# Patient Record
Sex: FEMALE | Race: WHITE | NOT HISPANIC OR LATINO | Employment: FULL TIME | URBAN - METROPOLITAN AREA
[De-identification: names, ages, dates, MRNs, and addresses within clinical notes are randomized per-mention and may not be internally consistent; named-entity substitution may affect disease eponyms.]

---

## 2017-05-12 ENCOUNTER — OFFICE VISIT (OUTPATIENT)
Dept: NEUROSURGERY | Facility: CLINIC | Age: 37
End: 2017-05-12

## 2017-05-12 VITALS
DIASTOLIC BLOOD PRESSURE: 75 MMHG | SYSTOLIC BLOOD PRESSURE: 131 MMHG | BODY MASS INDEX: 21.72 KG/M2 | HEART RATE: 47 BPM | WEIGHT: 122.6 LBS | HEIGHT: 63 IN

## 2017-05-12 DIAGNOSIS — M54.12 CERVICAL RADICULOPATHY: Primary | ICD-10-CM

## 2017-05-12 PROCEDURE — 99202 OFFICE O/P NEW SF 15 MIN: CPT | Performed by: PHYSICIAN ASSISTANT

## 2017-05-12 RX ORDER — IBUPROFEN 200 MG
200 TABLET ORAL EVERY 6 HOURS PRN
COMMUNITY
End: 2020-02-14 | Stop reason: HOSPADM

## 2017-06-01 ENCOUNTER — APPOINTMENT (OUTPATIENT)
Dept: PHYSICAL THERAPY | Facility: HOSPITAL | Age: 37
End: 2017-06-01

## 2017-06-12 ENCOUNTER — HOSPITAL ENCOUNTER (OUTPATIENT)
Dept: PHYSICAL THERAPY | Facility: HOSPITAL | Age: 37
Setting detail: THERAPIES SERIES
Discharge: HOME | End: 2017-06-12

## 2017-06-12 DIAGNOSIS — G89.29 CHRONIC LEFT SHOULDER PAIN: Primary | ICD-10-CM

## 2017-06-12 DIAGNOSIS — M25.512 CHRONIC LEFT SHOULDER PAIN: Primary | ICD-10-CM

## 2017-06-12 PROCEDURE — 97161 PT EVAL LOW COMPLEX 20 MIN: CPT

## 2017-06-12 PROCEDURE — 97110 THERAPEUTIC EXERCISES: CPT

## 2017-06-12 NOTE — THERAPY EVALUATION
Outpatient Physical Therapy Ortho Initial Evaluation  [facility]     Patient Name: Guerita Perla  : 1980  MRN: [id number]  Today's Date: 2017      Visit Date: 2017    Patient Active Problem List   Diagnosis   • Cervical radiculopathy        History reviewed. No pertinent past medical history.     Past Surgical History:   Procedure Laterality Date   • EXPLORATORY LAPAROTOMY         Visit Dx:     ICD-10-CM ICD-9-CM   1. Chronic left shoulder pain M25.512 719.41    G89.29 338.29             Patient History       17 1600          History    Chief Complaint Pain  -LS      Type of Pain Shoulder pain  -LS      Date Current Problem(s) Began 12  -LS      Brief Description of Current Complaint Pt reports intermittent L shoulder pain that comes and goes with activity and tingling into L hand that has happened for several years as well as burning and pain behind L shoulder surrounding shoulder blade. Heating pad reduces pain as well as laying down. Driving and sitting at her desk inc the pain.   -LS      Onset Date- PT 17  -LS      Patient/Caregiver Goals Relieve pain;Return to prior level of function;Improve mobility;Improve strength  -LS      Hand Dominance right-handed  -LS      Occupation/sports/leisure activities Pt is a  at [employer], [place], operated by [employer]. Plans to get into coding.  -LS      Patient seeing anyone else for problem(s)? Yes  -LS      What clinical tests have you had for this problem? --   none  -LS      Pain     Pain Location Shoulder  -LS      Pain at Present 3  -LS      Pain at Best 3  -LS      Pain at Worst 6  -LS      Pain Description Burning;Tingling  -LS      What Performance Factors Make the Current Problem(s) WORSE? driving, working   -LS      What Performance Factors Make the Current Problem(s) BETTER? heat, medication  -LS      Tolerance Time- Standing no change  -LS      Tolerance Time- Sitting 2 hours  -LS      Tolerance Time- Walking no change  -LS      Tolerance  Time- Lying no change  -LS      Is your sleep disturbed? No  -LS      What position do you sleep in? Right sidelying  -LS      Difficulties at work? Pt reports inc pain with work at desk.  -LS      Difficulties with ADL's? drying hair is difficult, feels weak   -LS      Difficulties with recreational activities? Pt denies   -LS      Fall Risk Assessment    Any falls in the past year: No  -LS      Services    Prior Rehab/Home Health Experiences No  -LS      Daily Activities    Primary Language English  -LS      Pt Participated in POC and Goals Yes  -LS      Safety    Are you being hurt, hit, or frightened by anyone at home or in your life? No  -LS      Are you being neglected by a caregiver No  -LS        User Key  (r) = Recorded By, (t) = Taken By, (c) = Cosigned By    Initials Name Provider Type    LS Toyin Barakat PT Physical Therapist                PT Ortho       06/12/17 1700    Subjective Comments    Subjective Comments I have a hard time relaxing. I have never been able to do it. I get headaches too from the tension.  -LS    Subjective Pain    Able to rate subjective pain? yes  -LS    Pre-Treatment Pain Level 4  -LS    Posture/Observations    Alignment Options Forward head;Rounded shoulders;Scapular elevation  -LS    Forward Head Severe  -LS    Rounded Shoulders Moderate  -LS    Scapular Elevation Right:;Mild  -LS    Sensation    Sensation WNL? WNL  -LS    Additional Comments pt reports intermittent tingling from L shoulder to L hand; all digits  -LS    DTR- Upper Quarter Clearing    Brachioradialis (C6) Bilateral:;2- Normal response  -LS    Triceps (C7) Bilateral:;2- Normal response  -LS    Myotomal Screen- Upper Quarter Clearing    Shoulder flexion (C5) Left:;4+ (Good +);Right:;5 (Normal)  -LS    Elbow flexion/wrist extension (C6) Bilateral:;4+ (Good +)  -LS    Elbow extension/wrist flexion (C7) Bilateral:;4 (Good)  -LS    Finger flexion/ (C8) WNL  -LS    Cervical/Shoulder ROM Screen    Cervical  flexion Normal  -LS    Cervical extension Normal  -LS    Cervical lateral flexion Normal  -LS    Cervical rotation Normal  -LS    Cervical quadrant (Spurling's) Normal  -LS    Shoulder elevation  Normal  -LS    Cervical Palpation    Suboccipital Guarded/taut   minimally  -LS    Spinous Process --   non-tender  -LS    Upper Traps Bilateral:;Guarded/taut   L > R  -LS    Middle Traps Guarded/taut  -LS    Rhomboids Bilateral:;Guarded/taut   L > R  -LS    Cervical Accessory Motions    OA Flexion Hypomobile  -LS    Sideglide- C3 WNL  -LS    Sideglide- C4 WNL  -LS    Sideglide- C5 WNL  -LS    Sideglide- C6 WNL  -LS    Sideglide- C7 WNL  -LS    PA glide- C3 WNL  -LS    PA glide- C4 WNL  -LS    PA glide- C5 WNL  -LS    PA glide- C6 WNL  -LS    PA glide- C7 WNL  -LS    Cervical/Thoracic Special Tests    Spurlings (Foraminal Compression) Negative  -LS    Cervical Compression (Forarminal Compression vs. Facet Pain) Negative  -LS    Cervical Distraction (Foraminal Compression vs. Facet Pain) Negative  -LS    ROM (Range of Motion)    General ROM Detail slight dec in LUE flexion/abduction; WNL   -LS    MMT (Manual Muscle Testing)    General MMT Assessment Detail L ER/IR 4/5; R ER/IR 4+/5  -LS    Upper Extremity Flexibility    Suboccipitals Bilateral:;Mildly limited  -LS    Scalenes Left:;Moderately limited  -LS    Upper Trapezius Bilateral:;Moderately limited  -LS    Pect Minor Bilateral:;Mildly limited  -LS    Pathomechanics    Pathomechanics Comments decreased strength B scapular retractors; difficulty activating.   -LS      User Key  (r) = Recorded By, (t) = Taken By, (c) = Cosigned By    Initials Name Provider Type    ZITA Barakat PT Physical Therapist                            Therapy Education       06/12/17 2129          Therapy Education    Given HEP;Symptoms/condition management;Pain management  -LS      Program New  -LS      How Provided Verbal;Demonstration;Written  -LS      Provided to Patient  -LS      Level of  Understanding Teach back education performed;Verbalized;Demonstrated  -LS        User Key  (r) = Recorded By, (t) = Taken By, (c) = Cosigned By    Initials Name Provider Type    ZITA Barakat PT Physical Therapist                PT OP Goals       06/12/17 1700       PT Short Term Goals    STG Date to Achieve 06/26/17  -LS     STG 1 Pt will demonstrate understanding and compliance with initial HEP  -LS     STG 1 Progress New  -LS     STG 2 Pt will report reduced pain at end of work day from 4/10 to less than or equal to 2/10 with management strategies and flexibility exercises.  -LS     STG 2 Progress New  -LS     STG 3 Pt will report dec incidence of tingling in L hand to less than 2x/week.  -LS     STG 3 Progress New  -LS     Long Term Goals    LTG Date to Achieve 07/12/17  -LS     LTG 1 Pt will report improved tolerance to driving without exacerbation of symptoms.  -LS     LTG 1 Progress New  -LS     LTG 2 Pt will demonstrate reduced disability evidenced by reduced DASH disability score from 15% to less than or equal to 5%.   -LS     LTG 2 Progress New  -LS     LTG 3 Pt will demonstrate improved LUE shoulder flexion/abduction strength to 5/5 and ER/IR strength to 4+/5 or better.  -LS     LTG 3 Progress New  -LS     Time Calculation    PT Goal Re-Cert Due Date 07/12/17  -LS       User Key  (r) = Recorded By, (t) = Taken By, (c) = Cosigned By    Initials Name Provider Type    ZITA Barakat PT Physical Therapist                PT Assessment/Plan       06/12/17 1726       PT Assessment    Functional Limitations Performance in work activities;Limitations in functional capacity and performance;Performance in self-care ADL;Limitation in home management  -LS     Impairments Muscle strength;Range of motion;Pain;Sensation;Impaired flexibility;Poor body mechanics;Posture;Impaired postural alignment  -LS     Assessment Comments Pt is 37 yo R handed female reporting 5 year hx of intermittent L shoulder pain surrounding  L scapula described as burning. She also reports intermittent tingling to L hand. Pt demonstrates normal cervical ROM in all planes without exacerbation of symptoms. Slight deficit in LUE flexion/abduction compared to R but remains functional. Spurling's, compression/distraction, shoulder abduction testing were all negative. RTC and impingement testing were also negative. Pt is slightly TTP in L supraspinatus and rhomboid muscles with moderate to severe tightness in L RTC, periscapular, upper trapezius musculature. She demonstrates normal PA and cervical spine mobility. She presents with poor posture with severely forward head, rounded shoulders, and extended upper cervical spine. 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 Pt demonstrates minimal dec strength L compared to R with greatest deficit in L IR/ER (4/5) compared to R (4+/5). L shoulder flexion/abduction 4+/5. She demonstrates signs and symptoms consistent with dec postural strength and poor body mechanics at work leading to muscular guarding and tightness and dec strength in scapular muscles as well as tightness in anterior muscles due to prolonged posturing. Due to nature of tingling that suggests cervical origin will continue to evaluate as she progresses, however, at today's evaluation, cervical contribution special testing was non-provocative. She will benefit from continued skilled PT services in order to improve postural muscle strength, reduce muscle guarding, and improve pt function.   -LS     Please refer to paper survey for additional self-reported information Yes  -LS     Rehab Potential Good  -LS     Patient/caregiver participated in establishment of treatment plan and goals Yes  -LS     Patient would benefit from skilled therapy intervention Yes  -LS     PT Plan    PT Frequency 2x/week  -LS     Predicted Duration of Therapy Intervention (days/wks) 4 weeks   -LS     Planned CPT's? PT  EVAL LOW COMPLEXITY: 85147;PT MANUAL THERAPY EA 15 MIN: 59438;PT THER PROC EA 15 MIN: 06984;PT THER ACT EA 15 MIN: 86212;PT NEUROMUSC RE-EDUCATION EA 15 MIN: 79881;PT HOT OR COLD PACK TREAT MCARE;PT HOT/COLD PACK WC NONMCARE: 20152;PT TRACTION CERVICAL: 72761  -LS     PT Plan Comments Initiate postural strengthening and flexibility program, consider cervical traction/DDN to reduce muscular guarding as needed.   -LS       User Key  (r) = Recorded By, (t) = Taken By, (c) = Cosigned By    Initials Name Provider Type    ZITA Barakat PT Physical Therapist                  Exercises       06/12/17 1700          Subjective Comments    Subjective Comments I have a hard time relaxing. I have never been able to do it. I get headaches too from the tension.  -LS      Subjective Pain    Able to rate subjective pain? yes  -LS      Pre-Treatment Pain Level 4  -LS      Exercise 1    Exercise Name 1 shoulder rolls  -LS      Exercise 2    Exercise Name 2 scap retraction  -LS      Exercise 3    Exercise Name 3 upper trap stretch  -LS      Exercise 4    Exercise Name 4 middle trap stretch  -LS      Exercise 5    Exercise Name 5 chin tuck supine  -LS        User Key  (r) = Recorded By, (t) = Taken By, (c) = Cosigned By    Initials Name Provider Type    ZITA Barakat PT Physical Therapist                              Outcome Measures       06/12/17 1700          DASH    Open a tight or new jar. 2  -LS      Write 1  -LS      Turn a key 1  -LS      Prepare a meal 1  -LS      Push open a heavy door 1  -LS      Place an object on a shelf above your head 1  -LS      Do heavy household chores (e.g., wash walls, wash floors) 1  -LS      Garden or do yard work 1  -LS      Make a bed 1  -LS      Carry a shopping bag or briefcase 2  -LS      Carry a heavy object (over 10 lbs) 2  -LS      Change a lightbulb overhead 3  -LS      Wash or blow dry your hair 3  -LS      Wash your back 2  -LS      Put on a pullover sweater 2  -LS      Use a  knife to cut food 1  -LS      Recreational activities in which require little effort (e.g., cardplaying, knitting, etc.) 1  -LS      Recreational activities in which you take some force or impact through your arm, should or hand (e.g. golf, hammering, tennis, etc.) 1  -LS      Recreational Activities in which you move your arm freely (e.g., frisbee, badminton, etc.) 2  -LS      Manage transportation needs (getting from one place to another) 1  -LS      Sexual Activities 1  -LS      During the past week, to what extent has your arm, shoulder, or hand problem interfered with your normal social activites with family, friends, neighbors or groups? 1  -LS      During the past week, were you limited in your work or other regular daily activities as a result of your arm, shoulder or hand problem? 1  -LS      Arm, Shoulder, or hand pain 3  -LS      Arm, shoulder or hand pain when you performed any specific activity 3  -LS      Tingling (pins and needles) in your arm, shoulder, or hand 3  -LS      Weakness in your arm, shoulder or hand 2  -LS      Stiffness in your arm, shoulder or hand 1  -LS      During the past week, how much difficulty have you had sleeping because of the pain in your arm, shoulder or hand? 2  -LS      I feel less capable, less confident or less useful because of my arm, shoulder or hand problem 1  -LS      DASH Sum  48  -LS      Number of Questions Answered 30  -LS      DASH Score 15  -LS      DASH COMMENTS Pt is R handed.  -LS      Functional Assessment    Outcome Measure Options Disabilities of the Arm, Shoulder, and Hand (DASH)  -LS        User Key  (r) = Recorded By, (t) = Taken By, (c) = Cosigned By    Initials Name Provider Type    LS Toyin Barakat PT Physical Therapist            Time Calculation:   Start Time: 1615  Stop Time: 1715  Time Calculation (min): 60 min     Therapy Charges for Today     Code Description Service Date Service Provider Modifiers Qty    87421777629  PT EVAL LOW  COMPLEXITY 3 6/12/2017 Toyin Barakat, PT GP 1    [id number] HC PT THER PROC EA 15 MIN 6/12/2017 Toyin Barakat, PT GP 1          PT G-Codes  Outcome Measure Options: Disabilities of the Arm, Shoulder, and Hand (DASH)         Toyin Barakat, PT  6/12/2017

## 2017-06-19 ENCOUNTER — APPOINTMENT (OUTPATIENT)
Dept: PHYSICAL THERAPY | Facility: HOSPITAL | Age: 37
End: 2017-06-19

## 2017-06-21 ENCOUNTER — APPOINTMENT (OUTPATIENT)
Dept: PHYSICAL THERAPY | Facility: HOSPITAL | Age: 37
End: 2017-06-21

## 2017-06-26 ENCOUNTER — APPOINTMENT (OUTPATIENT)
Dept: PHYSICAL THERAPY | Facility: HOSPITAL | Age: 37
End: 2017-06-26

## 2017-06-28 ENCOUNTER — APPOINTMENT (OUTPATIENT)
Dept: PHYSICAL THERAPY | Facility: HOSPITAL | Age: 37
End: 2017-06-28

## 2017-07-05 ENCOUNTER — APPOINTMENT (OUTPATIENT)
Dept: PHYSICAL THERAPY | Facility: HOSPITAL | Age: 37
End: 2017-07-05

## 2017-07-05 ENCOUNTER — DOCUMENTATION (OUTPATIENT)
Dept: PHYSICAL THERAPY | Facility: HOSPITAL | Age: 37
End: 2017-07-05

## 2017-07-05 NOTE — THERAPY DISCHARGE NOTE
Outpatient Physical Therapy Discharge Summary         Patient Name: Guerita Perla  : 1980  MRN: [id number]    Today's Date: 2017    Visit Dx:  No diagnosis found.          PT OP Goals       17 1500       PT Short Term Goals    STG Date to Achieve 17  -LS     STG 1 Pt will demonstrate understanding and compliance with initial HEP  -LS     STG 1 Progress Not Met  -LS     STG 2 Pt will report reduced pain at end of work day from 4/10 to less than or equal to 2/10 with management strategies and flexibility exercises.  -LS     STG 2 Progress Not Met  -LS     STG 3 Pt will report dec incidence of tingling in L hand to less than 2x/week.  -LS     STG 3 Progress Not Met  -LS     Long Term Goals    LTG Date to Achieve 17  -LS     LTG 1 Pt will report improved tolerance to driving without exacerbation of symptoms.  -LS     LTG 1 Progress Not Met  -LS     LTG 2 Pt will demonstrate reduced disability evidenced by reduced DASH disability score from 15% to less than or equal to 5%.   -LS     LTG 2 Progress Not Met  -LS     LTG 3 Pt will demonstrate improved LUE shoulder flexion/abduction strength to 5/5 and ER/IR strength to 4+/5 or better.  -LS     LTG 3 Progress Not Met  -LS       User Key  (r) = Recorded By, (t) = Taken By, (c) = Cosigned By    Initials Name Provider Type    ZITA Barakat, PT Physical Therapist          OP PT Discharge Summary  Date of Discharge: 17  Reason for Discharge: Unable to participate, Non-compliant  Outcomes Achieved: Unable to make functional progress toward goals at this time  Discharge Destination: Home with home program  Discharge Instructions: Pt did not return following first visit due to child sickness. Unable to reassess goals.       Time Calculation:                    Toyin Barakat PT  2017

## 2017-07-10 ENCOUNTER — APPOINTMENT (OUTPATIENT)
Dept: PHYSICAL THERAPY | Facility: HOSPITAL | Age: 37
End: 2017-07-10

## 2017-07-12 ENCOUNTER — APPOINTMENT (OUTPATIENT)
Dept: PHYSICAL THERAPY | Facility: HOSPITAL | Age: 37
End: 2017-07-12

## 2018-01-17 ENCOUNTER — OFFICE VISIT (OUTPATIENT)
Dept: GASTROENTEROLOGY | Facility: CLINIC | Age: 38
End: 2018-01-17

## 2018-01-17 VITALS
WEIGHT: 120.4 LBS | DIASTOLIC BLOOD PRESSURE: 70 MMHG | SYSTOLIC BLOOD PRESSURE: 122 MMHG | BODY MASS INDEX: 21.33 KG/M2 | TEMPERATURE: 98.1 F | HEIGHT: 63 IN

## 2018-01-17 DIAGNOSIS — R13.14 PHARYNGOESOPHAGEAL DYSPHAGIA: Primary | ICD-10-CM

## 2018-01-17 DIAGNOSIS — K21.9 GASTROESOPHAGEAL REFLUX DISEASE, ESOPHAGITIS PRESENCE NOT SPECIFIED: ICD-10-CM

## 2018-01-17 PROCEDURE — 99204 OFFICE O/P NEW MOD 45 MIN: CPT | Performed by: INTERNAL MEDICINE

## 2018-01-17 RX ORDER — UBIDECARENONE 100 MG
100 CAPSULE ORAL DAILY
COMMUNITY
End: 2020-02-14 | Stop reason: HOSPADM

## 2018-01-17 RX ORDER — PANTOPRAZOLE SODIUM 40 MG/1
40 TABLET, DELAYED RELEASE ORAL
Qty: 30 TABLET | Refills: 2 | Status: SHIPPED | OUTPATIENT
Start: 2018-01-17 | End: 2018-05-08 | Stop reason: SDUPTHER

## 2018-01-17 RX ORDER — SODIUM CHLORIDE, SODIUM LACTATE, POTASSIUM CHLORIDE, CALCIUM CHLORIDE 600; 310; 30; 20 MG/100ML; MG/100ML; MG/100ML; MG/100ML
30 INJECTION, SOLUTION INTRAVENOUS CONTINUOUS
Status: CANCELLED | OUTPATIENT
Start: 2018-02-14

## 2018-01-17 NOTE — PROGRESS NOTES
Chief Complaint   Patient presents with   • Difficulty Swallowing   • Heartburn   • esophageal discomfort       Subjective     HPI    Guerita Perla is a 37 y.o. female with a past medical history noted below who presents for evaluation of dysphagia and GERD.  Dysphagia has been an issue for the past 6 months.  No precipitating event.  Finds pills, meat like steak, breads, stick at her suprasternal notch.  She will try to drink lots of liquids to get the foods to pass.  Never had to regurgitate. Longest time a pill stuck it stayed for about 2 hours.  Associated with esophageal discomfort.  She is able to drink water with the food so she is not completely obstructed.  Symptoms are intermittent; only occurs with certain pills and foods.    She has had GERD for at least 10 years.  Se has been on a ppi for this period of time and has never been able to come off of it.  Describe as burning epigastric pain.  Has been on nexium previous, now on omeprazole.    No weight loss with the symptoms.  She does have nausea but not vomiting. No change in bowel habits.    Smokes about 3 cigarrettes daily, working on quitting.  Social ETOH.  No family history of GI malignancies.  She is a stay at home mom.        Past Medical History:   Diagnosis Date   • GERD (gastroesophageal reflux disease)          Current Outpatient Prescriptions:   •  coenzyme Q10 100 MG capsule, Take 100 mg by mouth Daily., Disp: , Rfl:   •  Nutritional Supplements (DHEA PO), Take  by mouth., Disp: , Rfl:   •  omeprazole (priLOSEC) 20 MG capsule, Take 20 mg by mouth Daily., Disp: , Rfl:   •  Prenatal Vit-Fe Fumarate-FA (PRENATAL FA PO), Take  by mouth., Disp: , Rfl:   •  ibuprofen (ADVIL,MOTRIN) 200 MG tablet, Take 200 mg by mouth Every 6 (Six) Hours As Needed for Mild Pain (1-3)., Disp: , Rfl:     No Known Allergies    Social History     Social History   • Marital status:      Spouse name: N/A   • Number of children: 1   • Years of education: College      Occupational History   • [employer]      Social History Main Topics   • Smoking status: Current Every Day Smoker     Packs/day: 0.25     Types: Cigarettes     Start date: 2002   • Smokeless tobacco: Never Used      Comment: 1 cigarette per day   • Alcohol use Yes      Comment: Socially    • Drug use: No   • Sexual activity: Not on file     Other Topics Concern   • Not on file     Social History Narrative       Family History   Problem Relation Age of Onset   • No Known Problems Mother    • No Known Problems Father        Review of Systems   Constitutional: Negative for activity change, appetite change and fatigue.   HENT: Positive for trouble swallowing. Negative for sore throat.    Respiratory: Negative.    Cardiovascular: Negative.    Gastrointestinal: Positive for nausea. Negative for abdominal distention, abdominal pain, blood in stool, constipation, diarrhea and vomiting.   Endocrine: Negative for cold intolerance and heat intolerance.   Genitourinary: Negative for difficulty urinating, dysuria and frequency.   Musculoskeletal: Negative for arthralgias, back pain and myalgias.   Skin: Negative.    Hematological: Negative for adenopathy. Does not bruise/bleed easily.   All other systems reviewed and are negative.      Objective     Vitals:    01/17/18 1256   BP: 122/70   Temp: 98.1 °F (36.7 °C)     Last 2 weights    01/17/18  1256   Weight: 54.6 kg (120 lb 6.4 oz)     Body mass index is 21.33 kg/(m^2).    Physical Exam   Constitutional: She is oriented to person, place, and time. She appears well-developed and well-nourished. No distress.   HENT:   Head: Normocephalic and atraumatic.   Right Ear: External ear normal.   Left Ear: External ear normal.   Nose: Nose normal.   Mouth/Throat: Oropharynx is clear and moist.   Eyes: Conjunctivae and EOM are normal. Right eye exhibits no discharge. Left eye exhibits no discharge. No scleral icterus.   Neck: Normal range of motion. Neck supple. No thyromegaly present.   No  supraclavicular adenopathy   Cardiovascular: Normal rate, regular rhythm, normal heart sounds and intact distal pulses.  Exam reveals no gallop.    No murmur heard.  No lower extremity edema   Pulmonary/Chest: Effort normal and breath sounds normal. No respiratory distress. She has no wheezes.   Abdominal: Soft. Normal appearance and bowel sounds are normal. She exhibits no distension and no mass. There is no hepatosplenomegaly. There is tenderness. There is no rigidity, no rebound and no guarding. No hernia.   Mid abd tenderness   Genitourinary:   Genitourinary Comments: Rectal exam deferred   Musculoskeletal: Normal range of motion. She exhibits no edema or tenderness.   No atrophy of upper or lower extremities.  Normal digits and nails of both hands.   Lymphadenopathy:     She has no cervical adenopathy.   Neurological: She is alert and oriented to person, place, and time. She displays no atrophy. Coordination normal.   Skin: Skin is warm and dry. No rash noted. She is not diaphoretic. No erythema.   Psychiatric: She has a normal mood and affect. Her behavior is normal. Judgment and thought content normal.   Vitals reviewed.      WBC   Date Value Ref Range Status   03/26/2015 9.56 3.50 - 10.80 K/mcL Final     RBC   Date Value Ref Range Status   03/26/2015 3.19 (L) 3.89 - 5.14 M/mcL Final     Hemoglobin   Date Value Ref Range Status   03/26/2015 10.1 (L) 11.5 - 15.5 g/dL Final     Hematocrit   Date Value Ref Range Status   03/26/2015 30.6 (L) 34.5 - 44.0 % Final     MCV   Date Value Ref Range Status   03/26/2015 95.9 80.0 - 99.0 fL Final     MCH   Date Value Ref Range Status   03/26/2015 31.7 (H) 27.0 - 31.0 pg Final     MCHC   Date Value Ref Range Status   03/26/2015 33.0 32.0 - 36.0 g/dL Final     Platelets   Date Value Ref Range Status   03/26/2015 126 (L) 150 - 450 K/mcL Final     Neutrophil Rel %   Date Value Ref Range Status   09/11/2014 64.8 41.0 - 71.0 % Final     Lymphocyte Rel %   Date Value Ref Range  Status   09/11/2014 26.7 24.0 - 44.0 % Final     Monocyte Rel %   Date Value Ref Range Status   09/11/2014 6.8 0.0 - 12.0 % Final     Eosinophil Rel %   Date Value Ref Range Status   09/11/2014 1.3 0.0 - 3.0 % Final     Basophil Rel %   Date Value Ref Range Status   09/11/2014 0.1 0.0 - 1.0 % Final     Neutrophils Absolute   Date Value Ref Range Status   09/11/2014 4.58 1.50 - 8.30 K/mcL Final     Lymphocytes Absolute   Date Value Ref Range Status   09/11/2014 1.89 0.60 - 4.80 K/mcL Final     Monocytes Absolute   Date Value Ref Range Status   09/11/2014 0.48 0.00 - 1.00 K/mcL Final     Eosinophils Absolute   Date Value Ref Range Status   09/11/2014 0.09 (L) 0.10 - 0.30 K/mcL Final     Basophils Absolute   Date Value Ref Range Status   09/11/2014 0.01 0.00 - 0.20 K/mcL Final       Glucose   Date Value Ref Range Status   09/11/2014 84 70 - 100 mg/dL Final     Creatinine   Date Value Ref Range Status   09/11/2014 0.5 (L) 0.6 - 1.3 mg/dL Final         Imaging Results (last 7 days)     ** No results found for the last 168 hours. **            No notes on file    Assessment/Plan    1. Dysphagia: new issue for her for the past 6 months.  ? Stricture, EoE    2. GERD: chronic problem, on omeprazole, has been on this for a long time    Plan  EGD for further eval of symptoms  Stop omeprazole    Start pantoprazole  Further recommendations after EGD  To ER for any food sticking >1hr    Guerita was seen today for difficulty swallowing, heartburn and esophageal discomfort.    Diagnoses and all orders for this visit:    Pharyngoesophageal dysphagia    Gastroesophageal reflux disease, esophagitis presence not specified      I have discussed the above plan with the patient.  They verbalize understanding and are in agreement with the plan.  They have been advised to contact the office for any questions, concerns, or changes related to their health.    Dictated utilizing Dragon dictation

## 2018-01-17 NOTE — PATIENT INSTRUCTIONS
Schedule the EGD    Stop the omeprazole.  Start the pantoprazole instead    To ER for any pills or food sticking for more than an hour    For any additional questions, concerns or changes to your condition after today's office visit please contact the office at [phone].

## 2018-02-14 ENCOUNTER — ANESTHESIA (OUTPATIENT)
Dept: GASTROENTEROLOGY | Facility: HOSPITAL | Age: 38
End: 2018-02-14

## 2018-02-14 ENCOUNTER — ANESTHESIA EVENT (OUTPATIENT)
Dept: GASTROENTEROLOGY | Facility: HOSPITAL | Age: 38
End: 2018-02-14

## 2018-02-14 ENCOUNTER — HOSPITAL ENCOUNTER (OUTPATIENT)
Facility: HOSPITAL | Age: 38
Setting detail: HOSPITAL OUTPATIENT SURGERY
Discharge: HOME | End: 2018-02-14
Attending: INTERNAL MEDICINE | Admitting: INTERNAL MEDICINE

## 2018-02-14 VITALS
DIASTOLIC BLOOD PRESSURE: 60 MMHG | HEIGHT: 63 IN | OXYGEN SATURATION: 97 % | RESPIRATION RATE: 16 BRPM | TEMPERATURE: 97.1 F | WEIGHT: 119.2 LBS | HEART RATE: 54 BPM | BODY MASS INDEX: 21.12 KG/M2 | SYSTOLIC BLOOD PRESSURE: 107 MMHG

## 2018-02-14 DIAGNOSIS — K21.9 GASTROESOPHAGEAL REFLUX DISEASE, ESOPHAGITIS PRESENCE NOT SPECIFIED: ICD-10-CM

## 2018-02-14 DIAGNOSIS — R13.14 PHARYNGOESOPHAGEAL DYSPHAGIA: ICD-10-CM

## 2018-02-14 LAB
B-HCG UR QL: NEGATIVE
INTERNAL NEGATIVE CONTROL: NEGATIVE
INTERNAL POSITIVE CONTROL: POSITIVE
Lab: NORMAL

## 2018-02-14 PROCEDURE — 88312 SPECIAL STAINS GROUP 1: CPT | Performed by: INTERNAL MEDICINE

## 2018-02-14 PROCEDURE — 25010000002 PROPOFOL 10 MG/ML EMULSION: Performed by: NURSE ANESTHETIST, CERTIFIED REGISTERED

## 2018-02-14 PROCEDURE — 43239 EGD BIOPSY SINGLE/MULTIPLE: CPT | Performed by: INTERNAL MEDICINE

## 2018-02-14 PROCEDURE — 88305 TISSUE EXAM BY PATHOLOGIST: CPT | Performed by: INTERNAL MEDICINE

## 2018-02-14 PROCEDURE — 25010000002 FENTANYL CITRATE (PF) 100 MCG/2ML SOLUTION: Performed by: NURSE ANESTHETIST, CERTIFIED REGISTERED

## 2018-02-14 RX ORDER — FENTANYL CITRATE 50 UG/ML
INJECTION, SOLUTION INTRAMUSCULAR; INTRAVENOUS AS NEEDED
Status: DISCONTINUED | OUTPATIENT
Start: 2018-02-14 | End: 2018-02-14 | Stop reason: SURG

## 2018-02-14 RX ORDER — LIDOCAINE HYDROCHLORIDE 20 MG/ML
INJECTION, SOLUTION INFILTRATION; PERINEURAL AS NEEDED
Status: DISCONTINUED | OUTPATIENT
Start: 2018-02-14 | End: 2018-02-14 | Stop reason: SURG

## 2018-02-14 RX ORDER — SODIUM CHLORIDE, SODIUM LACTATE, POTASSIUM CHLORIDE, CALCIUM CHLORIDE 600; 310; 30; 20 MG/100ML; MG/100ML; MG/100ML; MG/100ML
30 INJECTION, SOLUTION INTRAVENOUS CONTINUOUS
Status: DISCONTINUED | OUTPATIENT
Start: 2018-02-14 | End: 2018-02-14 | Stop reason: HOSPADM

## 2018-02-14 RX ORDER — PROPOFOL 10 MG/ML
VIAL (ML) INTRAVENOUS CONTINUOUS PRN
Status: DISCONTINUED | OUTPATIENT
Start: 2018-02-14 | End: 2018-02-14 | Stop reason: SURG

## 2018-02-14 RX ORDER — PROPOFOL 10 MG/ML
VIAL (ML) INTRAVENOUS AS NEEDED
Status: DISCONTINUED | OUTPATIENT
Start: 2018-02-14 | End: 2018-02-14 | Stop reason: SURG

## 2018-02-14 RX ADMIN — PROPOFOL 250 MCG/KG/MIN: 10 INJECTION, EMULSION INTRAVENOUS at 09:23

## 2018-02-14 RX ADMIN — FENTANYL CITRATE 25 MCG: 50 INJECTION INTRAMUSCULAR; INTRAVENOUS at 09:23

## 2018-02-14 RX ADMIN — PROPOFOL 80 MG: 10 INJECTION, EMULSION INTRAVENOUS at 09:23

## 2018-02-14 RX ADMIN — LIDOCAINE HYDROCHLORIDE 100 MG: 20 INJECTION, SOLUTION INFILTRATION; PERINEURAL at 09:23

## 2018-02-14 RX ADMIN — SODIUM CHLORIDE, POTASSIUM CHLORIDE, SODIUM LACTATE AND CALCIUM CHLORIDE 30 ML/HR: 600; 310; 30; 20 INJECTION, SOLUTION INTRAVENOUS at 08:45

## 2018-02-14 NOTE — ANESTHESIA PREPROCEDURE EVALUATION
Anesthesia Evaluation                  Airway   Mallampati: I  TM distance: >3 FB  Neck ROM: full  Dental - normal exam     Pulmonary    (+) a smoker Former,   Cardiovascular     (+) dysrhythmias (palpitations without ass symptoms, reecent echo and stress OK),       Neuro/Psych  GI/Hepatic/Renal/Endo    (+)  GERD,     Musculoskeletal     Abdominal    Substance History      OB/GYN          Other                        Anesthesia Plan    ASA 1     MAC     intravenous induction   Anesthetic plan and risks discussed with patient.

## 2018-02-14 NOTE — ANESTHESIA POSTPROCEDURE EVALUATION
"Patient: Guerita Perla    Procedure Summary     Date Anesthesia Start Anesthesia Stop Room / Location    02/14/18 0921 0943  [facility] /  [facility]       Procedure Diagnosis Surgeon Provider    ESOPHAGOGASTRODUODENOSCOPY with cold bx's (N/A Esophagus) Pharyngoesophageal dysphagia; Gastroesophageal reflux disease, esophagitis presence not specified  (Pharyngoesophageal dysphagia [R13.14]; Gastroesophageal reflux disease, esophagitis presence not specified [K21.9]) MD Karen Hull MD          Anesthesia Type: MAC  Last vitals  BP   112/92 (02/14/18 1020)   Temp   36.2 °C (97.1 °F) (02/14/18 1011)   Pulse   117 (02/14/18 1020)   Resp   18 (02/14/18 1020)     SpO2   (!) 88 % (02/14/18 1020)     Post Anesthesia Care and Evaluation    Patient location during evaluation: PHASE II  Patient participation: complete - patient participated  Level of consciousness: awake and alert  Pain management: adequate  Airway patency: patent  Anesthetic complications: No anesthetic complications    Cardiovascular status: acceptable  Respiratory status: acceptable  Hydration status: acceptable    Comments: /92 (BP Location: Left arm, Patient Position: Lying)  Pulse 117  Temp 36.2 °C (97.1 °F) (Oral)   Resp 18  Ht 160 cm (63\")  Wt 54.1 kg (119 lb 3.2 oz)  LMP 01/16/2018  SpO2 (!) 88%  BMI 21.12 kg/m2      "

## 2018-02-14 NOTE — PLAN OF CARE
Problem: Patient Care Overview (Adult)  Goal: Adult Individualization and Mutuality  Outcome: Ongoing (interventions implemented as appropriate)   02/14/18 0833   Individualization   Patient Specific Interventions denies     Goal: Discharge Needs Assessment  Outcome: Ongoing (interventions implemented as appropriate)   02/14/18 0833   Discharge Needs Assessment   Concerns To Be Addressed no discharge needs identified   Discharge Disposition home or self-care   Self-Care   Equipment Currently Used at Home none       Problem: GI Endoscopy (Adult)  Goal: Signs and Symptoms of Listed Potential Problems Will be Absent or Manageable (GI Endoscopy)  Outcome: Ongoing (interventions implemented as appropriate)   02/14/18 0833   GI Endoscopy   Problems Assessed (GI Endoscopy) pain;bleeding;fluid imbalance;hypoxia/hypoxemia   Problems Present (GI Endoscopy) none

## 2018-02-15 DIAGNOSIS — K63.9 SMALL INTESTINE DISORDER: Primary | ICD-10-CM

## 2018-02-15 LAB
CYTO UR: NORMAL
LAB AP CASE REPORT: NORMAL
Lab: NORMAL
PATH REPORT.FINAL DX SPEC: NORMAL
PATH REPORT.GROSS SPEC: NORMAL

## 2018-02-15 NOTE — PROGRESS NOTES
Her small bowel showed some very minor pathologic changes; this could be seen in celiac disease.  I have ordered celiac serology blood testing for her to come in and get at her convenience.  She does have some mild gastritis of her stomach and some inflammation of her lower esophagus most consistent with reflux.  Recommend continuing the pantoprazole.  Follow-up with me in office in about 3 months.

## 2018-02-16 ENCOUNTER — RESULTS ENCOUNTER (OUTPATIENT)
Dept: GASTROENTEROLOGY | Facility: CLINIC | Age: 38
End: 2018-02-16

## 2018-02-16 DIAGNOSIS — K63.9 SMALL INTESTINE DISORDER: ICD-10-CM

## 2018-02-21 ENCOUNTER — TELEPHONE (OUTPATIENT)
Dept: GASTROENTEROLOGY | Facility: CLINIC | Age: 38
End: 2018-02-21

## 2018-02-21 NOTE — TELEPHONE ENCOUNTER
----- Message from Dyana Garza MD sent at 2/15/2018 12:47 PM EST -----  Her small bowel showed some very minor pathologic changes; this could be seen in celiac disease.  I have ordered celiac serology blood testing for her to come in and get at her convenience.  She does have some mild gastritis of her stomach and some inflammation of her lower esophagus most consistent with reflux.  Recommend continuing the pantoprazole.  Follow-up with me in office in about 3 months.      Called pt and advised of the above and pt verb understanding.  Pt made lab appt for 02/26 at 10am and she made her f/u appt for 06/04 at 930am.

## 2018-02-23 ENCOUNTER — TELEPHONE (OUTPATIENT)
Dept: GASTROENTEROLOGY | Facility: CLINIC | Age: 38
End: 2018-02-23

## 2018-02-27 LAB
IGA SERPL-MCNC: 139 MG/DL (ref 87–352)
TTG IGA SER-ACNC: <2 U/ML (ref 0–3)
TTG IGG SER-ACNC: 2 U/ML (ref 0–5)

## 2018-03-01 ENCOUNTER — TELEPHONE (OUTPATIENT)
Dept: GASTROENTEROLOGY | Facility: CLINIC | Age: 38
End: 2018-03-01

## 2018-03-01 NOTE — TELEPHONE ENCOUNTER
----- Message from Dyana Garza MD sent at 3/1/2018  1:01 PM EST -----  Her blood work is not consistent with celiac disease.    If she is otherwise doing okay will touch base with her at her follow-up appointment in June.

## 2018-03-01 NOTE — TELEPHONE ENCOUNTER
----- Message from Debra Posadas sent at 3/1/2018 10:05 AM EST -----  Regarding: LABS   Contact: [phone]  PT CALLED FOR LABS

## 2018-03-01 NOTE — TELEPHONE ENCOUNTER
Call to pt. Advise per Dr Garza that blood work is not consistent with celiac disease.    If otherwise doing ok, will touch base at f/u appt in June.    Pt verb understanding.  States is doing well, and will see in June.

## 2018-03-09 ENCOUNTER — APPOINTMENT (OUTPATIENT)
Dept: WOMENS IMAGING | Facility: HOSPITAL | Age: 38
End: 2018-03-09

## 2018-03-09 PROCEDURE — 77066 DX MAMMO INCL CAD BI: CPT | Performed by: RADIOLOGY

## 2018-03-09 PROCEDURE — 77062 BREAST TOMOSYNTHESIS BI: CPT | Performed by: RADIOLOGY

## 2018-03-09 PROCEDURE — 76641 ULTRASOUND BREAST COMPLETE: CPT | Performed by: RADIOLOGY

## 2018-03-09 PROCEDURE — G0279 TOMOSYNTHESIS, MAMMO: HCPCS | Performed by: RADIOLOGY

## 2018-03-16 ENCOUNTER — APPOINTMENT (OUTPATIENT)
Dept: WOMENS IMAGING | Facility: HOSPITAL | Age: 38
End: 2018-03-16

## 2018-03-16 PROCEDURE — 19001 PUNCTURE ASPIR CYST BRST EA: CPT | Performed by: RADIOLOGY

## 2018-03-16 PROCEDURE — 19000 PUNCTURE ASPIR CYST BREAST: CPT | Performed by: RADIOLOGY

## 2018-03-16 PROCEDURE — 76942 ECHO GUIDE FOR BIOPSY: CPT | Performed by: RADIOLOGY

## 2018-05-08 ENCOUNTER — TELEPHONE (OUTPATIENT)
Dept: GASTROENTEROLOGY | Facility: CLINIC | Age: 38
End: 2018-05-08

## 2018-05-08 DIAGNOSIS — R13.14 PHARYNGOESOPHAGEAL DYSPHAGIA: ICD-10-CM

## 2018-05-08 DIAGNOSIS — K21.9 GASTROESOPHAGEAL REFLUX DISEASE, ESOPHAGITIS PRESENCE NOT SPECIFIED: ICD-10-CM

## 2018-05-08 RX ORDER — PANTOPRAZOLE SODIUM 40 MG/1
40 TABLET, DELAYED RELEASE ORAL
Qty: 30 TABLET | Refills: 2 | Status: SHIPPED | OUTPATIENT
Start: 2018-05-08 | End: 2018-09-06 | Stop reason: SDUPTHER

## 2018-05-08 NOTE — TELEPHONE ENCOUNTER
Escribe request for Pantoprazole located and filled as requested.    Call to pt to advise of same.  Verb understanding.

## 2018-05-08 NOTE — TELEPHONE ENCOUNTER
----- Message from Essence Montanez sent at 5/8/2018 10:29 AM EDT -----  Regarding: PT CALLED, WAITING ON PENDING MEDS   Contact: [phone]  ..

## 2018-07-19 ENCOUNTER — TRANSCRIBE ORDERS (OUTPATIENT)
Dept: LAB | Facility: HOSPITAL | Age: 38
End: 2018-07-19

## 2018-07-19 ENCOUNTER — LAB (OUTPATIENT)
Dept: LAB | Facility: HOSPITAL | Age: 38
End: 2018-07-19

## 2018-07-19 DIAGNOSIS — IMO0002 FEMALE INFERTILITY OF CERVICAL OR VAGINAL ORIGIN: Primary | ICD-10-CM

## 2018-07-19 DIAGNOSIS — IMO0002 FEMALE INFERTILITY OF CERVICAL OR VAGINAL ORIGIN: ICD-10-CM

## 2018-07-19 LAB — LH SERPL-ACNC: 8.32 MIU/ML

## 2018-07-19 PROCEDURE — 82670 ASSAY OF TOTAL ESTRADIOL: CPT

## 2018-07-19 PROCEDURE — 83002 ASSAY OF GONADOTROPIN (LH): CPT

## 2018-07-19 PROCEDURE — 36415 COLL VENOUS BLD VENIPUNCTURE: CPT

## 2018-07-20 LAB — ESTRADIOL SERPL HS-MCNC: 182.6 PG/ML

## 2018-09-06 DIAGNOSIS — R13.14 PHARYNGOESOPHAGEAL DYSPHAGIA: ICD-10-CM

## 2018-09-06 DIAGNOSIS — K21.9 GASTROESOPHAGEAL REFLUX DISEASE, ESOPHAGITIS PRESENCE NOT SPECIFIED: ICD-10-CM

## 2018-09-07 NOTE — TELEPHONE ENCOUNTER
Dbeby request received for pantoprazole 40 mg 1 tab po qam, #30, R2.  See o/v note of 1/17/18.  Was to return in June - cancelled appt.  Message to Dr Garza.

## 2018-09-10 ENCOUNTER — TELEPHONE (OUTPATIENT)
Dept: GASTROENTEROLOGY | Facility: CLINIC | Age: 38
End: 2018-09-10

## 2018-09-10 RX ORDER — PANTOPRAZOLE SODIUM 40 MG/1
40 TABLET, DELAYED RELEASE ORAL DAILY
Qty: 30 TABLET | Refills: 0 | Status: SHIPPED | OUTPATIENT
Start: 2018-09-10 | End: 2018-10-10 | Stop reason: SDUPTHER

## 2018-09-10 NOTE — TELEPHONE ENCOUNTER
Called pt and advised we can send one 30 day supply Of pantoprazole 40mg  to get her by till her appt with Eva RIOS.  Pt verb understanding and requested script get sent to the [facility]/  Pt verb understanding and script escribed.

## 2018-09-10 NOTE — TELEPHONE ENCOUNTER
----- Message from Merle Nguyen sent at 9/10/2018  9:02 AM EDT -----  Regarding: PT CALLED - SEE PENDING REFILL REQUEST  Contact: [phone]  [facility] O/V IN OCT WITH AYLIN. PT STATED SHE IS OUT OF MEDS.

## 2018-09-12 RX ORDER — PANTOPRAZOLE SODIUM 40 MG/1
TABLET, DELAYED RELEASE ORAL
Qty: 30 TABLET | Refills: 0 | Status: SHIPPED | OUTPATIENT
Start: 2018-09-12 | End: 2018-10-10 | Stop reason: SDUPTHER

## 2018-09-19 ENCOUNTER — APPOINTMENT (OUTPATIENT)
Dept: WOMENS IMAGING | Facility: HOSPITAL | Age: 38
End: 2018-09-19

## 2018-09-19 PROCEDURE — 76641 ULTRASOUND BREAST COMPLETE: CPT | Performed by: RADIOLOGY

## 2018-10-10 ENCOUNTER — TELEPHONE (OUTPATIENT)
Dept: GASTROENTEROLOGY | Facility: CLINIC | Age: 38
End: 2018-10-10

## 2018-10-10 RX ORDER — PANTOPRAZOLE SODIUM 40 MG/1
40 TABLET, DELAYED RELEASE ORAL DAILY
Qty: 30 TABLET | Refills: 1 | Status: SHIPPED | OUTPATIENT
Start: 2018-10-10 | End: 2018-11-14 | Stop reason: SDUPTHER

## 2018-10-10 NOTE — TELEPHONE ENCOUNTER
----- Message from Merle Nguyen sent at 10/10/2018 10:58 AM EDT -----  Regarding: Refill on Pantoprazole  Contact: [phone]  PT almost out of meds. Had to r/s o/v due to Eva being out. Can she get a refill. R/s appt for 11/14.

## 2018-10-10 NOTE — TELEPHONE ENCOUNTER
Debby completed for pantoprazole 40 mg 1 tab po daily, #30, R1.    Call to pt to advise of same.  Verb understanding.

## 2018-11-14 ENCOUNTER — OFFICE VISIT (OUTPATIENT)
Dept: GASTROENTEROLOGY | Facility: CLINIC | Age: 38
End: 2018-11-14

## 2018-11-14 VITALS
SYSTOLIC BLOOD PRESSURE: 120 MMHG | BODY MASS INDEX: 22.93 KG/M2 | WEIGHT: 129.4 LBS | HEIGHT: 63 IN | TEMPERATURE: 98.3 F | DIASTOLIC BLOOD PRESSURE: 72 MMHG

## 2018-11-14 DIAGNOSIS — K58.1 IRRITABLE BOWEL SYNDROME WITH CONSTIPATION: ICD-10-CM

## 2018-11-14 DIAGNOSIS — R10.9 ABDOMINAL CRAMPING: Primary | ICD-10-CM

## 2018-11-14 DIAGNOSIS — K21.9 GASTROESOPHAGEAL REFLUX DISEASE, ESOPHAGITIS PRESENCE NOT SPECIFIED: ICD-10-CM

## 2018-11-14 PROCEDURE — 99214 OFFICE O/P EST MOD 30 MIN: CPT | Performed by: NURSE PRACTITIONER

## 2018-11-14 RX ORDER — PANTOPRAZOLE SODIUM 40 MG/1
40 TABLET, DELAYED RELEASE ORAL DAILY
Qty: 30 TABLET | Refills: 11 | Status: SHIPPED | OUTPATIENT
Start: 2018-11-14 | End: 2020-02-14 | Stop reason: HOSPADM

## 2018-11-14 RX ORDER — POLYETHYLENE GLYCOL 3350 17 G/17G
17 POWDER, FOR SOLUTION ORAL DAILY
COMMUNITY
End: 2020-02-14 | Stop reason: HOSPADM

## 2018-11-14 RX ORDER — HYOSCYAMINE SULFATE 0.125 MG
0.12 TABLET ORAL EVERY 6 HOURS PRN
Qty: 90 TABLET | Refills: 5 | Status: SHIPPED | OUTPATIENT
Start: 2018-11-14 | End: 2020-02-14 | Stop reason: HOSPADM

## 2018-11-14 NOTE — PROGRESS NOTES
Chief Complaint   Patient presents with   • Follow-up     medication refill    • Irritable Bowel Syndrome       Guerita Perla is a  37 y.o. female here for a follow up visit for GERD and IBS.     HPI  36 yo f presents today for follow up visit for GERD and IBS-D with abd cramping. She is a patient of Dr. Garza. She was last seen in the office on 1/2018. She has hx GERD and admits she does well with Protonix 40 mg daily. She also has hx IBS-C with abd cramping and does well most of the time with Miralax. She admits as long as she stays regular she doesn't have any issues. But occasionally she will be more constipated and have some abd cramping. She was told by her PCP to try some Levsin for the cramping. She would like to try it if she could. She denies any dysphagia, reflux, abd pain, N&V, diarrhea, constipation, rectal bleeding or melena. She admits her appetite is good and her weight is stable.     Past Medical History:   Diagnosis Date   • GERD (gastroesophageal reflux disease)    • Trouble swallowing        Past Surgical History:   Procedure Laterality Date   • EXPLORATORY LAPAROTOMY     • WISDOM TOOTH EXTRACTION         Scheduled Meds:    Continuous Infusions:  No current facility-administered medications for this visit.     PRN Meds:.    No Known Allergies    Social History     Socioeconomic History   • Marital status:      Spouse name: Not on file   • Number of children: 1   • Years of education: College   • Highest education level: Not on file   Social Needs   • Financial resource strain: Not on file   • Food insecurity - worry: Not on file   • Food insecurity - inability: Not on file   • Transportation needs - medical: Not on file   • Transportation needs - non-medical: Not on file   Occupational History   • Occupation: FOC   Tobacco Use   • Smoking status: Former Smoker     Packs/day: 0.25     Types: Cigarettes     Start date: 2002   • Smokeless tobacco: Never Used   • Tobacco comment: 1  cigarette per day   Substance and Sexual Activity   • Alcohol use: Yes     Comment: Socially    • Drug use: No   • Sexual activity: Not on file   Other Topics Concern   • Not on file   Social History Narrative   • Not on file       Family History   Problem Relation Age of Onset   • No Known Problems Mother    • No Known Problems Father    • Malig Hyperthermia Neg Hx        Review of Systems   Constitutional: Negative for appetite change, chills, diaphoresis, fatigue, fever and unexpected weight change.   HENT: Negative for nosebleeds, postnasal drip, sore throat, trouble swallowing and voice change.    Respiratory: Negative for cough, choking, chest tightness, shortness of breath and wheezing.    Cardiovascular: Negative for chest pain.   Gastrointestinal: Negative for abdominal distention, abdominal pain, anal bleeding, blood in stool, constipation, diarrhea, nausea, rectal pain and vomiting.   Endocrine: Negative for polydipsia, polyphagia and polyuria.   Musculoskeletal: Negative for gait problem.   Skin: Negative for rash and wound.   Allergic/Immunologic: Negative for food allergies.   Neurological: Negative for dizziness, speech difficulty and light-headedness.   Psychiatric/Behavioral: Negative for confusion, self-injury, sleep disturbance and suicidal ideas.       Vitals:    11/14/18 1449   BP: 120/72   Temp: 98.3 °F (36.8 °C)       Physical Exam   Constitutional: She is oriented to person, place, and time. She appears well-developed and well-nourished. She does not appear ill. No distress.   HENT:   Head: Normocephalic.   Eyes: Pupils are equal, round, and reactive to light.   Cardiovascular: Normal rate, regular rhythm and normal heart sounds.   Pulmonary/Chest: Effort normal and breath sounds normal.   Abdominal: Soft. Bowel sounds are normal. She exhibits no distension and no mass. There is no hepatosplenomegaly. There is no tenderness. There is no rebound and no guarding. No hernia.   Musculoskeletal:  Normal range of motion.   Neurological: She is alert and oriented to person, place, and time.   Skin: Skin is warm and dry.   Psychiatric: She has a normal mood and affect. Her speech is normal and behavior is normal. Judgment normal.       No images are attached to the encounter.    Assessment & Plan    1. Abdominal cramping  - hyoscyamine (ANASPAZ,LEVSIN) 0.125 MG tablet; Take 1 tablet by mouth Every 6 (Six) Hours As Needed for Cramping.  Dispense: 90 tablet; Refill: 5    2. Gastroesophageal reflux disease, esophagitis presence not specified  - pantoprazole (PROTONIX) 40 MG EC tablet; Take 1 tablet by mouth Daily.  Dispense: 30 tablet; Refill: 11    3. Irritable bowel syndrome with constipation    GERD seems well controlled on Protonix 40 mg daily. Will refill x 1 year. IBS-C seems stable at this time on the  Miralax. Will let her try some LEVSIN PRN for abd cramping. Call office with update in 2-3 weeks. Follow up with me or Dr. Garza in 1 year. Call office with any issues.

## 2019-04-10 ENCOUNTER — APPOINTMENT (OUTPATIENT)
Dept: WOMENS IMAGING | Facility: HOSPITAL | Age: 39
End: 2019-04-10

## 2019-04-10 PROCEDURE — 77063 BREAST TOMOSYNTHESIS BI: CPT | Performed by: RADIOLOGY

## 2019-04-10 PROCEDURE — 77067 SCR MAMMO BI INCL CAD: CPT | Performed by: RADIOLOGY

## 2019-08-19 ENCOUNTER — LAB REQUISITION (OUTPATIENT)
Dept: LAB | Facility: HOSPITAL | Age: 39
End: 2019-08-19

## 2019-08-19 DIAGNOSIS — Z00.00 ROUTINE GENERAL MEDICAL EXAMINATION AT A HEALTH CARE FACILITY: ICD-10-CM

## 2019-08-19 LAB — HCG INTACT+B SERPL-ACNC: 921.3 MIU/ML

## 2019-08-19 PROCEDURE — 84702 CHORIONIC GONADOTROPIN TEST: CPT | Performed by: OBSTETRICS & GYNECOLOGY

## 2019-08-21 ENCOUNTER — LAB REQUISITION (OUTPATIENT)
Dept: LAB | Facility: HOSPITAL | Age: 39
End: 2019-08-21

## 2019-08-21 DIAGNOSIS — Z00.00 ROUTINE GENERAL MEDICAL EXAMINATION AT A HEALTH CARE FACILITY: ICD-10-CM

## 2019-08-21 LAB — HCG INTACT+B SERPL-ACNC: 1974 MIU/ML

## 2019-08-21 PROCEDURE — 84702 CHORIONIC GONADOTROPIN TEST: CPT

## 2019-12-11 ENCOUNTER — TRANSCRIBE ORDERS (OUTPATIENT)
Dept: OBSTETRICS AND GYNECOLOGY | Facility: HOSPITAL | Age: 39
End: 2019-12-11

## 2019-12-11 DIAGNOSIS — O09.529 AMA (ADVANCED MATERNAL AGE) MULTIGRAVIDA 35+, UNSPECIFIED TRIMESTER: Primary | ICD-10-CM

## 2019-12-11 DIAGNOSIS — Z78.9 CONCEIVED BY IN VITRO FERTILIZATION: ICD-10-CM

## 2020-01-06 ENCOUNTER — APPOINTMENT (OUTPATIENT)
Dept: WOMENS IMAGING | Facility: HOSPITAL | Age: 40
End: 2020-01-06

## 2020-02-14 ENCOUNTER — HOSPITAL ENCOUNTER (OUTPATIENT)
Facility: HOSPITAL | Age: 40
Setting detail: OBSERVATION
Discharge: HOME | End: 2020-02-15
Attending: OBSTETRICS & GYNECOLOGY | Admitting: OBSTETRICS & GYNECOLOGY

## 2020-02-14 ENCOUNTER — APPOINTMENT (OUTPATIENT)
Dept: ULTRASOUND IMAGING | Facility: HOSPITAL | Age: 40
End: 2020-02-14

## 2020-02-14 ENCOUNTER — HOSPITAL ENCOUNTER (OUTPATIENT)
Facility: HOSPITAL | Age: 40
Setting detail: OBSERVATION
Discharge: HOME | End: 2020-02-14
Attending: OBSTETRICS & GYNECOLOGY | Admitting: OBSTETRICS & GYNECOLOGY

## 2020-02-14 VITALS
HEIGHT: 63 IN | TEMPERATURE: 97.7 F | RESPIRATION RATE: 16 BRPM | HEART RATE: 61 BPM | WEIGHT: 165 LBS | BODY MASS INDEX: 29.23 KG/M2 | OXYGEN SATURATION: 98 %

## 2020-02-14 PROBLEM — R10.9 ABDOMINAL PAIN: Status: ACTIVE | Noted: 2020-02-14

## 2020-02-14 PROBLEM — Z34.90 PREGNANCY: Status: ACTIVE | Noted: 2020-02-14

## 2020-02-14 LAB
ABO GROUP BLD: NORMAL
ALBUMIN SERPL-MCNC: 3.6 G/DL (ref 3.5–5.2)
ALBUMIN/GLOB SERPL: 1.1 G/DL
ALP SERPL-CCNC: 78 U/L (ref 39–117)
ALT SERPL W P-5'-P-CCNC: 15 U/L (ref 1–33)
ANION GAP SERPL CALCULATED.3IONS-SCNC: 13 MMOL/L (ref 5–15)
AST SERPL-CCNC: 21 U/L (ref 1–32)
BACTERIA UR QL AUTO: ABNORMAL /HPF
BACTERIA UR QL AUTO: ABNORMAL /HPF
BILIRUB SERPL-MCNC: 0.2 MG/DL (ref 0.2–1.2)
BILIRUB UR QL STRIP: NEGATIVE
BILIRUB UR QL STRIP: NEGATIVE
BLD GP AB SCN SERPL QL: NEGATIVE
BUN BLD-MCNC: 8 MG/DL (ref 6–20)
BUN/CREAT SERPL: 12.7 (ref 7–25)
CALCIUM SPEC-SCNC: 9.1 MG/DL (ref 8.6–10.5)
CHLORIDE SERPL-SCNC: 104 MMOL/L (ref 98–107)
CLARITY UR: ABNORMAL
CLARITY UR: CLEAR
CO2 SERPL-SCNC: 19 MMOL/L (ref 22–29)
COARSE GRAN CASTS URNS QL MICRO: ABNORMAL /LPF
COLOR UR: YELLOW
COLOR UR: YELLOW
CREAT BLD-MCNC: 0.63 MG/DL (ref 0.57–1)
DEPRECATED RDW RBC AUTO: 43.9 FL (ref 37–54)
ERYTHROCYTE [DISTWIDTH] IN BLOOD BY AUTOMATED COUNT: 12.8 % (ref 12.3–15.4)
FIBRINOGEN PPP-MCNC: 451 MG/DL (ref 220–400)
GFR SERPL CREATININE-BSD FRML MDRD: 106 ML/MIN/1.73
GLOBULIN UR ELPH-MCNC: 3.2 GM/DL
GLUCOSE BLD-MCNC: 103 MG/DL (ref 65–99)
GLUCOSE UR STRIP-MCNC: NEGATIVE MG/DL
GLUCOSE UR STRIP-MCNC: NEGATIVE MG/DL
HCT VFR BLD AUTO: 38.3 % (ref 34–46.6)
HGB BLD-MCNC: 12.8 G/DL (ref 12–15.9)
HGB UR QL STRIP.AUTO: ABNORMAL
HGB UR QL STRIP.AUTO: ABNORMAL
HYALINE CASTS UR QL AUTO: ABNORMAL /LPF
KETONES UR QL STRIP: ABNORMAL
KETONES UR QL STRIP: NEGATIVE
LEUKOCYTE ESTERASE UR QL STRIP.AUTO: ABNORMAL
LEUKOCYTE ESTERASE UR QL STRIP.AUTO: NEGATIVE
MCH RBC QN AUTO: 31.3 PG (ref 26.6–33)
MCHC RBC AUTO-ENTMCNC: 33.4 G/DL (ref 31.5–35.7)
MCV RBC AUTO: 93.6 FL (ref 79–97)
MUCOUS THREADS URNS QL MICRO: ABNORMAL /HPF
MUCOUS THREADS URNS QL MICRO: ABNORMAL /HPF
NITRITE UR QL STRIP: NEGATIVE
NITRITE UR QL STRIP: NEGATIVE
PH UR STRIP.AUTO: 6 [PH] (ref 5–8)
PH UR STRIP.AUTO: 6.5 [PH] (ref 5–8)
PLATELET # BLD AUTO: 183 10*3/MM3 (ref 140–450)
PMV BLD AUTO: 10.9 FL (ref 6–12)
POTASSIUM BLD-SCNC: 4 MMOL/L (ref 3.5–5.2)
PROT SERPL-MCNC: 6.8 G/DL (ref 6–8.5)
PROT UR QL STRIP: ABNORMAL
PROT UR QL STRIP: NEGATIVE
RBC # BLD AUTO: 4.09 10*6/MM3 (ref 3.77–5.28)
RBC # UR: ABNORMAL /HPF
RBC # UR: ABNORMAL /HPF
REF LAB TEST METHOD: ABNORMAL
REF LAB TEST METHOD: ABNORMAL
RENAL EPI CELLS #/AREA URNS HPF: ABNORMAL /HPF
RH BLD: POSITIVE
SODIUM BLD-SCNC: 136 MMOL/L (ref 136–145)
SP GR UR STRIP: 1.02 (ref 1–1.03)
SP GR UR STRIP: 1.02 (ref 1–1.03)
SQUAMOUS #/AREA URNS HPF: ABNORMAL /HPF
SQUAMOUS #/AREA URNS HPF: ABNORMAL /HPF
T&S EXPIRATION DATE: NORMAL
UROBILINOGEN UR QL STRIP: ABNORMAL
UROBILINOGEN UR QL STRIP: ABNORMAL
WBC NRBC COR # BLD: 8.12 10*3/MM3 (ref 3.4–10.8)
WBC UR QL AUTO: ABNORMAL /HPF
WBC UR QL AUTO: ABNORMAL /HPF

## 2020-02-14 PROCEDURE — 59025 FETAL NON-STRESS TEST: CPT

## 2020-02-14 PROCEDURE — 99218 PR INITIAL OBSERVATION CARE/DAY 30 MINUTES: CPT | Performed by: OBSTETRICS & GYNECOLOGY

## 2020-02-14 PROCEDURE — 96375 TX/PRO/DX INJ NEW DRUG ADDON: CPT

## 2020-02-14 PROCEDURE — 85027 COMPLETE CBC AUTOMATED: CPT | Performed by: OBSTETRICS & GYNECOLOGY

## 2020-02-14 PROCEDURE — 76775 US EXAM ABDO BACK WALL LIM: CPT

## 2020-02-14 PROCEDURE — 96361 HYDRATE IV INFUSION ADD-ON: CPT

## 2020-02-14 PROCEDURE — 81001 URINALYSIS AUTO W/SCOPE: CPT | Performed by: OBSTETRICS & GYNECOLOGY

## 2020-02-14 PROCEDURE — 85384 FIBRINOGEN ACTIVITY: CPT | Performed by: OBSTETRICS & GYNECOLOGY

## 2020-02-14 PROCEDURE — G0378 HOSPITAL OBSERVATION PER HR: HCPCS

## 2020-02-14 PROCEDURE — 82365 CALCULUS SPECTROSCOPY: CPT | Performed by: OBSTETRICS & GYNECOLOGY

## 2020-02-14 PROCEDURE — 25010000002 ONDANSETRON PER 1 MG: Performed by: OBSTETRICS & GYNECOLOGY

## 2020-02-14 PROCEDURE — 87086 URINE CULTURE/COLONY COUNT: CPT | Performed by: OBSTETRICS & GYNECOLOGY

## 2020-02-14 PROCEDURE — 86900 BLOOD TYPING SEROLOGIC ABO: CPT | Performed by: OBSTETRICS & GYNECOLOGY

## 2020-02-14 PROCEDURE — 86850 RBC ANTIBODY SCREEN: CPT | Performed by: OBSTETRICS & GYNECOLOGY

## 2020-02-14 PROCEDURE — P9612 CATHETERIZE FOR URINE SPEC: HCPCS

## 2020-02-14 PROCEDURE — 96374 THER/PROPH/DIAG INJ IV PUSH: CPT

## 2020-02-14 PROCEDURE — 80053 COMPREHEN METABOLIC PANEL: CPT | Performed by: OBSTETRICS & GYNECOLOGY

## 2020-02-14 PROCEDURE — 86901 BLOOD TYPING SEROLOGIC RH(D): CPT | Performed by: OBSTETRICS & GYNECOLOGY

## 2020-02-14 PROCEDURE — 25010000002 HYDROMORPHONE 1 MG/ML SOLUTION: Performed by: OBSTETRICS & GYNECOLOGY

## 2020-02-14 RX ORDER — ACETAMINOPHEN 500 MG
500 TABLET ORAL EVERY 6 HOURS PRN
COMMUNITY
End: 2020-02-14 | Stop reason: HOSPADM

## 2020-02-14 RX ORDER — ACETAMINOPHEN 500 MG
1000 TABLET ORAL EVERY 6 HOURS PRN
COMMUNITY
End: 2020-04-19 | Stop reason: HOSPADM

## 2020-02-14 RX ORDER — SODIUM CHLORIDE 0.9 % (FLUSH) 0.9 %
1-10 SYRINGE (ML) INJECTION AS NEEDED
Status: DISCONTINUED | OUTPATIENT
Start: 2020-02-14 | End: 2020-02-15 | Stop reason: HOSPADM

## 2020-02-14 RX ORDER — GUAIFENESIN 600 MG/1
1200 TABLET, EXTENDED RELEASE ORAL 2 TIMES DAILY
COMMUNITY
End: 2020-04-16

## 2020-02-14 RX ORDER — OMEPRAZOLE 20 MG/1
20 CAPSULE, DELAYED RELEASE ORAL DAILY
COMMUNITY
End: 2020-04-19 | Stop reason: HOSPADM

## 2020-02-14 RX ORDER — OMEPRAZOLE 20 MG/1
20 CAPSULE, DELAYED RELEASE ORAL DAILY
COMMUNITY
End: 2020-02-14 | Stop reason: HOSPADM

## 2020-02-14 RX ORDER — SODIUM CHLORIDE 0.9 % (FLUSH) 0.9 %
3 SYRINGE (ML) INJECTION EVERY 12 HOURS SCHEDULED
Status: DISCONTINUED | OUTPATIENT
Start: 2020-02-14 | End: 2020-02-15 | Stop reason: HOSPADM

## 2020-02-14 RX ORDER — ONDANSETRON 2 MG/ML
4 INJECTION INTRAMUSCULAR; INTRAVENOUS EVERY 6 HOURS PRN
Status: DISCONTINUED | OUTPATIENT
Start: 2020-02-14 | End: 2020-02-15 | Stop reason: HOSPADM

## 2020-02-14 RX ORDER — DEXTROSE, SODIUM CHLORIDE, SODIUM LACTATE, POTASSIUM CHLORIDE, AND CALCIUM CHLORIDE 5; .6; .31; .03; .02 G/100ML; G/100ML; G/100ML; G/100ML; G/100ML
125 INJECTION, SOLUTION INTRAVENOUS CONTINUOUS
Status: DISCONTINUED | OUTPATIENT
Start: 2020-02-14 | End: 2020-02-15 | Stop reason: HOSPADM

## 2020-02-14 RX ORDER — SODIUM CHLORIDE, SODIUM LACTATE, POTASSIUM CHLORIDE, CALCIUM CHLORIDE 600; 310; 30; 20 MG/100ML; MG/100ML; MG/100ML; MG/100ML
125 INJECTION, SOLUTION INTRAVENOUS CONTINUOUS
Status: DISCONTINUED | OUTPATIENT
Start: 2020-02-14 | End: 2020-02-14

## 2020-02-14 RX ORDER — DEXTROSE, SODIUM CHLORIDE, SODIUM LACTATE, POTASSIUM CHLORIDE, AND CALCIUM CHLORIDE 5; .6; .31; .03; .02 G/100ML; G/100ML; G/100ML; G/100ML; G/100ML
1000 INJECTION, SOLUTION INTRAVENOUS CONTINUOUS
Status: ACTIVE | OUTPATIENT
Start: 2020-02-14 | End: 2020-02-14

## 2020-02-14 RX ADMIN — SODIUM CHLORIDE, POTASSIUM CHLORIDE, SODIUM LACTATE AND CALCIUM CHLORIDE 125 ML/HR: 600; 310; 30; 20 INJECTION, SOLUTION INTRAVENOUS at 17:25

## 2020-02-14 RX ADMIN — SODIUM CHLORIDE, SODIUM LACTATE, POTASSIUM CHLORIDE, CALCIUM CHLORIDE AND DEXTROSE MONOHYDRATE 1000 ML/HR: 5; 600; 310; 30; 20 INJECTION, SOLUTION INTRAVENOUS at 18:18

## 2020-02-14 RX ADMIN — ONDANSETRON 4 MG: 2 INJECTION INTRAMUSCULAR; INTRAVENOUS at 18:00

## 2020-02-14 RX ADMIN — HYDROMORPHONE HYDROCHLORIDE 1 MG: 1 INJECTION, SOLUTION INTRAMUSCULAR; INTRAVENOUS; SUBCUTANEOUS at 18:00

## 2020-02-14 NOTE — H&P
\[facility]  Obstetric History and Physical    Referring Provider: Aleena Stringer MD      Chief Complaint   Patient presents with   • Abdominal Pain     sharp pain radiating to back - right sided   • Morning Sickness     intermittent       Subjective     Patient is a 39 y.o. female  currently at 30w2d, who presents with c/o abdominal pain.  Patient was seen earlier this morning with exact same pain that resolved after presentation to labor and delivery.  Patient states at approximately 130 today the pain returned.  She describes it as sharp moderate to severe intermittent left lower quadrant radiating to left flank.  She denies fever, leaking of fluid, vaginal bleeding, regular activity, diarrhea, constipation, and reports normal fetal activity.  She denies any history of prior kidney stone.  Prenatal care by Dr. Nguyễn without complications to date.  OB history significant for previous term vaginal delivery.  Past medical history significant for GERD, irritable bowel syndrome, endometriosis, and UTIs.     .    The following portions of the patients history were reviewed and updated as appropriate: current medications, allergies, past medical history, past surgical history, past family history, past social history and problem list .       Prenatal Information:   Maternal Prenatal Labs  Blood Type No results found for: ABO   Rh Status No results found for: RH   Antibody Screen No results found for: ABSCRN   Gonnorhea No results found for: GCCX   Chlamydia No results found for: CLAMYDCU   RPR No results found for: RPR   Syphilis Antibody No results found for: SYPHILIS   Rubella No results found for: RUBELLAIGGIN   Hepatitis B Surface Antigen No results found for: HEPBSAG   HIV-1 Antibody No results found for: LABHIV1   Hepatitis C Antibody No results found for: HEPCAB   Rapid Urin Drug Screen No results found for: AMPMETHU, BARBITSCNUR, LABBENZSCN, LABMETHSCN, LABOPIASCN, THCURSCR, COCAINEUR, AMPHETSCREEN,  PROPOXSCN, BUPRENORSCNU, METAMPSCNUR, OXYCODONESCN, TRICYCLICSCN   Group B Strep Culture No results found for: GBSANTIGEN           External Prenatal Results     Pregnancy Outside Results - Transcribed From Office Records - See Scanned Records For Details     Test Value Date Time    Hgb 12.8 g/dL 20 1735    Hct 38.3 % 20 1735    ABO       Rh       Antibody Screen       Glucose Fasting GTT       Glucose Tolerance Test 1 hour       Glucose Tolerance Test 3 hour       Gonorrhea (discrete)       Chlamydia (discrete)       RPR       VDRL       Syphilis Antibody       Rubella       HBsAg NonReactive  14 1529    Herpes Simplex Virus PCR       Herpes Simplex VIrus Culture       HIV       Hep C RNA Quant PCR       Hep C Antibody       AFP       Group B Strep       GBS Susceptibility to Clindamycin       GBS Susceptibility to Erythromycin       Fetal Fibronectin       Genetic Testing, Maternal Blood             Drug Screening     Test Value Date Time    Urine Drug Screen       Amphetamine Screen Negative ng/mL 14 1529    Barbiturate Screen Negative ng/mL 14 1529    Benzodiazepine Screen Negative ng/mL 14 1529    Methadone Screen Negative ng/mL 14 1529    Phencyclidine Screen Negative ng/mL 14 1529    Opiates Screen       THC Screen       Cocaine Screen       Propoxyphene Screen Negative ng/mL 14 1529    Buprenorphine Screen Negative ng/mL 14 1529    Methamphetamine Screen       Oxycodone Screen Negative ng/mL 14 1529    Tricyclic Antidepressants Screen                     Past OB History:       OB History    Para Term  AB Living   2 1 1 0 0 1   SAB TAB Ectopic Molar Multiple Live Births   0 0 0 0 0 1      # Outcome Date GA Lbr Miller/2nd Weight Sex Delivery Anes PTL Lv   2 Current            1 Term 03/24/15 41w0d   F Vag-Spont EPI N MARCELINA       Past Medical History: Past Medical History:   Diagnosis Date   • Endometriosis    • GERD (gastroesophageal  reflux disease)    • Ovarian cyst    • Trouble swallowing    • Urinary tract infection       Past Surgical History Past Surgical History:   Procedure Laterality Date   • ENDOSCOPY N/A 2/14/2018    Z-line regular, 39 cm from the incisors, esophageal mucosal changes suspicious for eosinophilic esophagitis, gastritis, duodenal mucosal lymphangiectasia   • EXPLORATORY LAPAROTOMY     • WISDOM TOOTH EXTRACTION        Family History: Family History   Problem Relation Age of Onset   • Hypertension Mother    • Hypertension Father    • Hypertension Brother    • Hypertension Sister    • Malig Hyperthermia Neg Hx       Social History:  reports that she has quit smoking. Her smoking use included cigarettes. She started smoking about 18 years ago. She smoked 0.25 packs per day. She has never used smokeless tobacco.   reports that she drank alcohol.   reports that she does not use drugs.                   General ROS Negative Findings:Headaches, Visual Changes, Epigastric pain, Anorexia, Nausia/Vomiting, ROM and Vaginal Bleeding    ROS     All other systems have been reviewed and are neg  Objective       Vital Signs Range for the last 24 hours  Temperature: Temp:  [97.7 °F (36.5 °C)-97.9 °F (36.6 °C)] 97.9 °F (36.6 °C)   Temp Source: Temp src: Oral   BP: BP: (128)/(87) 128/87   Pulse: Heart Rate:  [61-86] 86   Respirations: Resp:  [16] 16   SPO2: SpO2:  [98 %] 98 %   O2 Amount (l/min):     O2 Devices     Weight: Weight:  [74.8 kg (165 lb)] 74.8 kg (165 lb)     Physical Examination:   General:   alert, appears stated age and cooperative   Skin:   normal   HEENT:  Sclera clear   Lungs:   clear to auscultation bilaterally   Heart:   regular rate and rhythm, S1, S2 normal, no murmur, click, rub or gallop   Abdomen:  Soft, gravid uterus, no guarding or rebound nonacute.   Lower Extremities  no edema, no calf tenderness full range of motion   Pelvis:  Exam deferred.         Fetal Heart Rate Assessment   Method:     Beats/min:        Baseline:     Varibility:     Accels:     Decels:     Tracing Category:       Uterine Assessment   Method: Method: palpation, per patient report   Frequency (min):     Ctx Count in 10 min:     Duration:     Intensity:     Intensity by IUPC:     Resting Tone:     Resting Tone by IUPC:     [place] Units:       Laboratory Results:   Lab Results (last 24 hours)     Procedure Component Value Units Date/Time    Comprehensive Metabolic Panel [[id number]] Updated:  02/14/20 1749    Specimen:  Blood     Urinalysis With Microscopic If Indicated (No Culture) - Urine, Catheter [[id number]]  (Abnormal) Collected:  02/14/20 1733    Specimen:  Urine, Catheter Updated:  02/14/20 1746     Color, UA Yellow     Appearance, UA Clear     pH, UA 6.0     Specific Gravity, UA 1.021     Glucose, UA Negative     Ketones, UA 80 mg/dL (3+)     Bilirubin, UA Negative     Blood, UA Moderate (2+)     Protein, UA Trace     Leuk Esterase, UA Negative     Nitrite, UA Negative     Urobilinogen, UA 0.2 E.U./dL    Urinalysis, Microscopic Only - Urine, Catheter [[id number]] Collected:  02/14/20 1733    Specimen:  Urine, Catheter Updated:  02/14/20 1746    CBC (No Diff) [[id number]]  (Normal) Collected:  02/14/20 1735    Specimen:  Blood Updated:  02/14/20 1744     WBC 8.12 10*3/mm3      RBC 4.09 10*6/mm3      Hemoglobin 12.8 g/dL      Hematocrit 38.3 %      MCV 93.6 fL      MCH 31.3 pg      MCHC 33.4 g/dL      RDW 12.8 %      RDW-SD 43.9 fl      MPV 10.9 fL      Platelets 183 10*3/mm3     Fibrinogen [[id number]] Collected:  02/14/20 1735    Specimen:  Blood Updated:  02/14/20 1741        Radiology Review:   Imaging Results (Last 24 Hours)     ** No results found for the last 24 hours. **        Other Studies:    Assessment/Plan       Abdominal pain        Assessment:  1.  Intrauterine pregnancy at 30w2d weeks gestation with reactive fetal status.    2.  Abdominal pain consider ureterolithiasis  3.  No evidence of labor rupture membranes  4.       Plan:  1. OBS, insert IV IV hydration, CBC, CMP, fibrinogen, cath UA, renal U/S, strain urine  2. Plan of care has been reviewed with patient.  3.  Risks, benefits of treatment plan have been discussed.  4.  All questions have been answered.  5   discussed with Dr. Bernal.      Leonel Randolph,   2/14/2020  5:50 PM

## 2020-02-14 NOTE — H&P
\[facility]  Obstetric History and Physical    Referring Provider: Aleena Stringer MD      Chief Complaint   Patient presents with   • Back Pain     Left sided pain       Subjective     Patient is a 39 y.o. female  currently at 30w2d, who presents with C/O left-sided abdominal pain.  She reports constant sharp left abdominal pain that lasted an hour this morning without any associated leaking of fluid, vaginal bleeding, regular activity, fever, or any associated symptoms or concerns.  Patient reports normal fetal activity.  After arrival to labor and delivery pain resolved.  [facility]atal[facility] by Dr. Nguyễn without complications.  Previous obstetrical history stay for term vaginal delivery..      The following portions of the patients history were reviewed and updated as appropriate: current medications, allergies, past medical history, past surgical history, past family history, past social history and problem list .       Prenatal Information:   Maternal Prenatal Labs  Blood Type No results found for: ABO   Rh Status No results found for: RH   Antibody Screen No results found for: ABSCRN   Gonnorhea No results found for: GCCX   Chlamydia No results found for: CLAMYDCU   RPR No results found for: RPR   Syphilis Antibody No results found for: SYPHILIS   Rubella No results found for: RUBELLAIGGIN   Hepatitis B Surface Antigen No results found for: HEPBSAG   HIV-1 Antibody No results found for: LABHIV1   Hepatitis C Antibody No results found for: HEPCAB   Rapid Urin Drug Screen No results found for: AMPMETHU, BARBITSCNUR, LABBENZSCN, LABMETHSCN, LABOPIASCN, THCURSCR, COCAINEUR, AMPHETSCREEN, PROPOXSCN, BUPRENORSCNU, METAMPSCNUR, OXYCODONESCN, TRICYCLICSCN   Group B Strep Culture No results found for: GBSANTIGEN           External Prenatal Results     Pregnancy Outside Results - Transcribed From Office Records - See Scanned Records For Details     Test Value Date Time    Hgb 10.1 g/dL 03/26/15 0838    Hct 30.6 %  03/26/15 0838    ABO       Rh       Antibody Screen       Glucose Fasting GTT       Glucose Tolerance Test 1 hour       Glucose Tolerance Test 3 hour       Gonorrhea (discrete)       Chlamydia (discrete)       RPR       VDRL       Syphilis Antibody       Rubella       HBsAg NonReactive  14 1529    Herpes Simplex Virus PCR       Herpes Simplex VIrus Culture       HIV       Hep C RNA Quant PCR       Hep C Antibody       AFP       Group B Strep       GBS Susceptibility to Clindamycin       GBS Susceptibility to Erythromycin       Fetal Fibronectin       Genetic Testing, Maternal Blood             Drug Screening     Test Value Date Time    Urine Drug Screen       Amphetamine Screen Negative ng/mL 14 1529    Barbiturate Screen Negative ng/mL 14 1529    Benzodiazepine Screen Negative ng/mL 14 1529    Methadone Screen Negative ng/mL 14 1529    Phencyclidine Screen Negative ng/mL 14 1529    Opiates Screen       THC Screen       Cocaine Screen       Propoxyphene Screen Negative ng/mL 14 1529    Buprenorphine Screen Negative ng/mL 14 1529    Methamphetamine Screen       Oxycodone Screen Negative ng/mL 14 1529    Tricyclic Antidepressants Screen                     Past OB History:       OB History    Para Term  AB Living   2 1 1 0 0 1   SAB TAB Ectopic Molar Multiple Live Births   0 0 0 0 0 1      # Outcome Date GA Lbr Miller/2nd Weight Sex Delivery Anes PTL Lv   2 Current            1 Term 03/24/15 41w0d   F Vag-Spont EPI N MARCELINA       Past Medical History: Past Medical History:   Diagnosis Date   • Endometriosis    • GERD (gastroesophageal reflux disease)    • Ovarian cyst    • Trouble swallowing    • Urinary tract infection       Past Surgical History Past Surgical History:   Procedure Laterality Date   • ENDOSCOPY N/A 2018    Z-line regular, 39 cm from the incisors, esophageal mucosal changes suspicious for eosinophilic esophagitis, gastritis, duodenal  mucosal lymphangiectasia   • EXPLORATORY LAPAROTOMY     • WISDOM TOOTH EXTRACTION        Family History: Family History   Problem Relation Age of Onset   • Hypertension Mother    • Hypertension Father    • Hypertension Brother    • Hypertension Sister    • Malig Hyperthermia Neg Hx       Social History:  reports that she has quit smoking. Her smoking use included cigarettes. She started smoking about 18 years ago. She smoked 0.25 packs per day. She has never used smokeless tobacco.   reports that she drank alcohol.   reports that she does not use drugs.                   General ROS Negative Findings:Headaches, Visual Changes, Epigastric pain, Anorexia, Nausia/Vomiting, ROM and Vaginal Bleeding    ROS     All other systems have been reviewed and are neg  Objective       Vital Signs Range for the last 24 hours  Temperature: Temp:  [97.7 °F (36.5 °C)] 97.7 °F (36.5 °C)   Temp Source: Temp src: Oral   BP:     Pulse: Heart Rate:  [61] 61   Respirations: Resp:  [16] 16   SPO2: SpO2:  [98 %] 98 %   O2 Amount (l/min):     O2 Devices     Weight: Weight:  [74.8 kg (165 lb)] 74.8 kg (165 lb)     Physical Examination:   General:   alert, appears stated age and cooperative   Skin:   normal   HEENT:  sclera clear    Lungs:      Heart:      Abdomen:  Soft, gravid uterus, no guarding, rebound benign exam   Lower Extremities  trace edema, no catheters, full range of motion.   Pelvis:  External genitalia: normal general appearance  Uterus: enlarged     Neuro: No focal deficits DTRs 2+ 4 no clonus    Presentation: vtx   Cervix: Exam by: Method: sterile exam per physician   Dilation: closed   Effacement:  th   Station:  -4       Fetal Heart Rate Assessment   Method: Fetal HR Assessment Method: external   Beats/min: Fetal HR (beats/min): 135   Baseline: Fetal Heart Baseline Rate: normal range   Varibility: Fetal HR Variability: moderate (amplitude range 6 to 25 bpm)   Accels: Fetal HR Accelerations: greater than/equal to 15 bpm,  lasting at least 15 seconds   Decels: Fetal HR Decelerations: absent   Tracing Category:     NST- indications abdominal pain interpretation reactive, moderate variability, accelerations present 15 x 15, no decelerations, onset 7:28 AM and time 8:44 AM.  No contractions noted  Uterine Assessment   Method: Method: external tocotransducer   Frequency (min): Contraction Frequency (Minutes): .   Ctx Count in 10 min:     Duration:     Intensity: Contraction Intensity: no contractions   Intensity by IUPC:     Resting Tone:     Resting Tone by IUPC:     Tarzan Units:       Laboratory Results:   Lab Results (last 24 hours)     Procedure Component Value Units Date/Time    Urinalysis, Microscopic Only - Urine, Clean Catch [[id number]]  (Abnormal) Collected:  02/14/20 0738    Specimen:  Urine, Clean Catch Updated:  02/14/20 0809     RBC, UA Too Numerous to Count /HPF      WBC, UA 6-12 /HPF      Bacteria, UA None Seen /HPF      Squamous Epithelial Cells, UA 7-12 /HPF      Renal Epithelial Cells, UA 0-2 /HPF      Coarse Granular Casts, UA 3-6 /LPF      Mucus, UA Small/1+ /HPF      Methodology Manual Light Microscopy    Urinalysis With Culture If Indicated - Urine, Clean Catch [[id number]]  (Abnormal) Collected:  02/14/20 0738    Specimen:  Urine, Clean Catch Updated:  02/14/20 0803     Color, UA Yellow     Appearance, UA Cloudy     pH, UA 6.5     Specific Gravity, UA 1.016     Glucose, UA Negative     Ketones, UA Negative     Bilirubin, UA Negative     Blood, UA Large (3+)     Protein, UA Negative     Leuk Esterase, UA Small (1+)     Nitrite, UA Negative     Urobilinogen, UA 0.2 E.U./dL    Urine Culture - Urine, Urine, Clean Catch [[id number]] Collected:  02/14/20 0738    Specimen:  Urine, Clean Catch Updated:  02/14/20 0747        Radiology Review:   Imaging Results (Last 24 Hours)     ** No results found for the last 24 hours. **        Other Studies: UA contaminated culture pending patient asymptomatic    Assessment/Plan        Pregnancy        Assessment:  1.  Intrauterine pregnancy at 30w2d weeks gestation with reactive fetal status.    2.  Abdominal pain resolved-suspect fetus change presentation from transverse to vertex  3.  No evidence of labor rupture membranes  4.  Urine contaminated without any signs of infection-culture pending  5.  AMA  6.  Previous term vaginal delivery.  Plan:  1. D/C to home, kick count given,  labor instructions given, follow-up with OB routinely PRN.  2. Plan of care has been reviewed with patient.  3.  Risks, benefits of treatment plan have been discussed.  4.  All questions have been answered.  5   discussed with Dr. Bernal.      Leonel Randolph,   2020  8:51 AM   ADMIT

## 2020-02-14 NOTE — NURSING NOTE
Discharge instructions reviewed with patient, patient verbalizes understanding of teaching, all questions answered. Instructed to come back if increase in pain, decrease fetal movement, LOF, regular contractions or vaginal bleeding. All belongings with patient, patient accompanied by .

## 2020-02-15 VITALS
HEIGHT: 63 IN | WEIGHT: 165 LBS | RESPIRATION RATE: 16 BRPM | SYSTOLIC BLOOD PRESSURE: 121 MMHG | DIASTOLIC BLOOD PRESSURE: 72 MMHG | HEART RATE: 69 BPM | TEMPERATURE: 97.6 F | BODY MASS INDEX: 29.23 KG/M2

## 2020-02-15 PROBLEM — N20.1 URETERAL STONE: Status: ACTIVE | Noted: 2020-02-15

## 2020-02-15 LAB — BACTERIA SPEC AEROBE CULT: NORMAL

## 2020-02-15 PROCEDURE — G0378 HOSPITAL OBSERVATION PER HR: HCPCS

## 2020-02-15 PROCEDURE — 96361 HYDRATE IV INFUSION ADD-ON: CPT

## 2020-02-15 PROCEDURE — 59025 FETAL NON-STRESS TEST: CPT

## 2020-02-15 RX ADMIN — SODIUM CHLORIDE, SODIUM LACTATE, POTASSIUM CHLORIDE, CALCIUM CHLORIDE AND DEXTROSE MONOHYDRATE 125 ML/HR: 5; 600; 310; 30; 20 INJECTION, SOLUTION INTRAVENOUS at 05:31

## 2020-02-15 NOTE — PROGRESS NOTES
Laborist    Chart reviewed patient seen and examined  Patient's pain improved after Dilaudid  Vital signs stable afebrile  WBC 8.12   H&H stable at 12.8 and 38.3 CMP within normal limits   Cr 0.63    Renal ultrasound noted very mild left-sided hydronephrosis, no evidence of nephrolithiasis    IMP  1. IUP at 30 weeks 2 days          2.  Highly suspicious of left renal lithiasis (no evidence of obstruction renal ultrasound or infection)    PLAN         1.  Observe overnight, IV hydration, pain control, strain UA, clear liquid diet.

## 2020-02-15 NOTE — DISCHARGE SUMMARY
Admission date: 2/14/2020  Discharge date: 02/15/20    Referring Provider: Aleena Stringer MD    Admission diagnosis:  Abdominal pain [R10.9]  Ureteral stone [N20.1]    Patient Active Problem List   Diagnosis   • Cervical radiculopathy   • Pharyngoesophageal dysphagia   • Gastroesophageal reflux disease   • Pregnancy   • Abdominal pain   • Ureteral stone       Discharge diagnosis:  Ureteral stone  Consultants:  None    Hospital course:  She presented to labor and delivery with acute onset abdominal and back pain.  Patient was found to be writhing with discomfort with hematuria on urinalysis.  Ultrasound was consistent with hydronephrosis.  The patient was admitted for suspected ureteral stone and given IV fluid hydration and pain control.  The patient passed a large stone and immediately felt better.  The patient was observed overnight and had no issues.  On hospital day #1 she was feeling well and discharged home.      Vitals:    02/15/20 0413   BP: 104/47   Pulse: 70   Resp: 16   Temp: 97.9 °F (36.6 °C)       GENERAL:  Well-developed, well-nourished in no acute distress.   ABD:  Gravid  NST:  Reactive   SVE:  FHT's  EXTREMITIES:  No clubbing, cyanosis or edema.  PSYCHIATRIC:  Normal affect and mood.      Discharge condition: stable  Discharge diet   Dietary Orders (From admission, onward)     Start     Ordered    02/14/20 2110  Diet Regular  Diet Effective Now     Question:  Diet Texture / Consistency  Answer:  Regular    02/14/20 2110              Additional Instructions: Call with fevers, uncontrolled nausea/vomiting/pain.  Medications:      Discharge Medications      Continue These Medications      Instructions Start Date   acetaminophen 500 MG tablet  Commonly known as:  TYLENOL   1,000 mg, Oral, Every 6 Hours PRN      guaiFENesin 600 MG 12 hr tablet  Commonly known as:  MUCINEX   1,200 mg, Oral, 2 Times Daily      omeprazole 20 MG capsule  Commonly known as:  priLOSEC   20 mg, Oral, Daily      PRENATAL FA PO    1 tablet, Oral, Daily         Stop These Medications    DHEA PO          Disposition:Home or Self Care  Follow up: 2 weeks      Jhonathan Bernal MD

## 2020-02-15 NOTE — PROGRESS NOTES
Daily Progress Note    Patient name: Guerita Perla  YOB: 1980   MRN: [id number]  Admission Date: 2020  Date of Service: 2020  Referring Provider: Aleena Stringer MD    Guerita Perla is a 39 y.o.    at 30w2d  admitted on 2020 for obstructing ureteral stone.  She has passed a large stone tonight and feeling much better.     Hospital day 0      Diagnoses:   Patient Active Problem List    Diagnosis   • Pregnancy [Z34.90]   • Abdominal pain [R10.9]   • Pharyngoesophageal dysphagia [R13.14]   • Gastroesophageal reflux disease [K21.9]   • Cervical radiculopathy [M54.12]       Chief Complaint:  Chief Complaint   Patient presents with   • Abdominal Pain     sharp pain radiating to back - right sided   • Morning Sickness     intermittent       Subjective:      Guerita has no complaints today.  Reports fetal movement is normal  Denies leakage of amniotic fluid.  Denies vaginal bleeding    Objective:     Vital signs:  Temp:  [97.7 °F (36.5 °C)-97.9 °F (36.6 °C)] 97.9 °F (36.6 °C)  Heart Rate:  [61-86] 86  Resp:  [16] 16  BP: (128)/(87) 128/87    Abdomen: soft, nontender  Uterus: gravid, nontender  Extremities: nontender; no edema        Non-Stress Test:    Fetal Heart Rate Assessment   Method: Fetal HR Assessment Method: external   Beats/min: Fetal HR (beats/min): 140   Baseline: Fetal Heart Baseline Rate: normal range   Variability: Fetal HR Variability: moderate (amplitude range 6 to 25 bpm)   Accels: Fetal HR Accelerations: greater than/equal to 15 bpm, lasting at least 15 seconds   Decels: Fetal HR Decelerations: absent   Tracing Category:       Uterine Assessment   Method: Method: external tocotransducer   Frequency (min):     Ctx Count in 10 min:     Duration:     Intensity: Contraction Intensity: no contractions   Intensity by IUPC:     Resting Tone:     Resting Tone by IUPC:     Wallace Units:       Cervix: Exam by:     Dilation:     Effacement:     Station:         [facility]  recent ultrasound:    Medications:    sodium chloride 3 mL Intravenous Q12H      HYDROmorphone  •  ondansetron  •  sodium chloride    Labs:  Lab Results (last 24 hours)     Procedure Component Value Units Date/Time    Comprehensive Metabolic Panel [[id number]]  (Abnormal) Collected:  02/14/20 1816    Specimen:  Blood Updated:  02/14/20 1854     Glucose 103 mg/dL      BUN 8 mg/dL      Creatinine 0.63 mg/dL      Sodium 136 mmol/L      Potassium 4.0 mmol/L      Chloride 104 mmol/L      CO2 19.0 mmol/L      Calcium 9.1 mg/dL      Total Protein 6.8 g/dL      Albumin 3.60 g/dL      ALT (SGPT) 15 U/L      AST (SGOT) 21 U/L      Alkaline Phosphatase 78 U/L      Total Bilirubin 0.2 mg/dL      eGFR Non African Amer 106 mL/min/1.73      Globulin 3.2 gm/dL      A/G Ratio 1.1 g/dL      BUN/Creatinine Ratio 12.7     Anion Gap 13.0 mmol/L     Narrative:       GFR Normal >60  Chronic Kidney Disease <60  Kidney Failure <15      Fibrinogen [[id number]]  (Abnormal) Collected:  02/14/20 1735    Specimen:  Blood Updated:  02/14/20 1805     Fibrinogen 451 mg/dL     Urinalysis, Microscopic Only - Urine, Catheter [[id number]]  (Abnormal) Collected:  02/14/20 1733    Specimen:  Urine, Catheter Updated:  02/14/20 1751     RBC, UA Too Numerous to Count /HPF      WBC, UA 3-5 /HPF      Bacteria, UA Trace /HPF      Squamous Epithelial Cells, UA 13-20 /HPF      Hyaline Casts, UA 0-6 /LPF      Mucus, UA Small/1+ /HPF      Methodology Manual Light Microscopy    Urinalysis With Microscopic If Indicated (No Culture) - Urine, Catheter [[id number]]  (Abnormal) Collected:  02/14/20 1733    Specimen:  Urine, Catheter Updated:  02/14/20 1746     Color, UA Yellow     Appearance, UA Clear     pH, UA 6.0     Specific Gravity, UA 1.021     Glucose, UA Negative     Ketones, UA 80 mg/dL (3+)     Bilirubin, UA Negative     Blood, UA Moderate (2+)     Protein, UA Trace     Leuk Esterase, UA Negative     Nitrite, UA Negative     Urobilinogen, UA 0.2 E.U./dL    CBC (No  Diff) [[id number]]  (Normal) Collected:  20 1735    Specimen:  Blood Updated:  20 1744     WBC 8.12 10*3/mm3      RBC 4.09 10*6/mm3      Hemoglobin 12.8 g/dL      Hematocrit 38.3 %      MCV 93.6 fL      MCH 31.3 pg      MCHC 33.4 g/dL      RDW 12.8 %      RDW-SD 43.9 fl      MPV 10.9 fL      Platelets 183 10*3/mm3         Lab Results   Component Value Date    HGB 12.8 2020         Assessment/Plan:      Guerita is a 39 y.o.    at 30w2d.  1. Obstructing ureteral stone- passed stone tonight. Will continue to observe overnight and DC in the morning if pain continues to subside.   .  All questions were answered to the best my ability.    Jhonathan Bernal MD  2020

## 2020-02-29 LAB
CAHPO4 CRY STONE QL IR: 20 %
COLOR STONE: NORMAL
COM CRY STONE QL IR: 20 %
COMPN STONE: NORMAL
HYDROXYAPATITE: 60 %
LABORATORY COMMENT REPORT: NORMAL
Lab: NORMAL
Lab: NORMAL
PHOTO: NORMAL
SIZE STONE: NORMAL MM
SPECIMEN SOURCE: NORMAL
WT STONE: 44.5 MG

## 2020-04-16 ENCOUNTER — PRE-ADMISSION TESTING (OUTPATIENT)
Dept: PREADMISSION TESTING | Facility: HOSPITAL | Age: 40
End: 2020-04-16

## 2020-04-16 ENCOUNTER — PREP FOR SURGERY (OUTPATIENT)
Dept: OTHER | Facility: HOSPITAL | Age: 40
End: 2020-04-16

## 2020-04-16 VITALS — WEIGHT: 172.8 LBS | HEIGHT: 62 IN | BODY MASS INDEX: 31.8 KG/M2

## 2020-04-16 DIAGNOSIS — Z3A.39 39 WEEKS GESTATION OF PREGNANCY: Primary | ICD-10-CM

## 2020-04-16 LAB
ABO GROUP BLD: NORMAL
BLD GP AB SCN SERPL QL: NEGATIVE
DEPRECATED RDW RBC AUTO: 48.2 FL (ref 37–54)
ERYTHROCYTE [DISTWIDTH] IN BLOOD BY AUTOMATED COUNT: 13.6 % (ref 12.3–15.4)
HCT VFR BLD AUTO: 38.5 % (ref 34–46.6)
HGB BLD-MCNC: 12.6 G/DL (ref 12–15.9)
MCH RBC QN AUTO: 31.5 PG (ref 26.6–33)
MCHC RBC AUTO-ENTMCNC: 32.7 G/DL (ref 31.5–35.7)
MCV RBC AUTO: 96.3 FL (ref 79–97)
PLATELET # BLD AUTO: 151 10*3/MM3 (ref 140–450)
PMV BLD AUTO: 11.2 FL (ref 6–12)
RBC # BLD AUTO: 4 10*6/MM3 (ref 3.77–5.28)
RH BLD: POSITIVE
T&S EXPIRATION DATE: NORMAL
WBC NRBC COR # BLD: 7.64 10*3/MM3 (ref 3.4–10.8)

## 2020-04-16 PROCEDURE — 85027 COMPLETE CBC AUTOMATED: CPT | Performed by: NURSE PRACTITIONER

## 2020-04-16 PROCEDURE — 86850 RBC ANTIBODY SCREEN: CPT | Performed by: NURSE PRACTITIONER

## 2020-04-16 PROCEDURE — 86900 BLOOD TYPING SEROLOGIC ABO: CPT | Performed by: NURSE PRACTITIONER

## 2020-04-16 PROCEDURE — 86901 BLOOD TYPING SEROLOGIC RH(D): CPT | Performed by: NURSE PRACTITIONER

## 2020-04-16 RX ORDER — OXYTOCIN-SODIUM CHLORIDE 0.9% IV SOLN 30 UNIT/500ML 30-0.9/5 UT/ML-%
85 SOLUTION INTRAVENOUS ONCE
Status: CANCELLED | OUTPATIENT
Start: 2020-04-16 | End: 2020-04-16

## 2020-04-16 RX ORDER — SODIUM CHLORIDE 0.9 % (FLUSH) 0.9 %
3 SYRINGE (ML) INJECTION EVERY 12 HOURS SCHEDULED
Status: CANCELLED | OUTPATIENT
Start: 2020-04-16

## 2020-04-16 RX ORDER — METHYLERGONOVINE MALEATE 0.2 MG/ML
200 INJECTION INTRAVENOUS ONCE AS NEEDED
Status: CANCELLED | OUTPATIENT
Start: 2020-04-16

## 2020-04-16 RX ORDER — DOCUSATE SODIUM 100 MG/1
100 CAPSULE, LIQUID FILLED ORAL DAILY
COMMUNITY

## 2020-04-16 RX ORDER — SODIUM CHLORIDE, SODIUM LACTATE, POTASSIUM CHLORIDE, CALCIUM CHLORIDE 600; 310; 30; 20 MG/100ML; MG/100ML; MG/100ML; MG/100ML
125 INJECTION, SOLUTION INTRAVENOUS CONTINUOUS
Status: CANCELLED | OUTPATIENT
Start: 2020-04-16

## 2020-04-16 RX ORDER — LIDOCAINE HYDROCHLORIDE 10 MG/ML
5 INJECTION, SOLUTION EPIDURAL; INFILTRATION; INTRACAUDAL; PERINEURAL AS NEEDED
Status: CANCELLED | OUTPATIENT
Start: 2020-04-16

## 2020-04-16 RX ORDER — OXYTOCIN-SODIUM CHLORIDE 0.9% IV SOLN 30 UNIT/500ML 30-0.9/5 UT/ML-%
650 SOLUTION INTRAVENOUS ONCE
Status: CANCELLED | OUTPATIENT
Start: 2020-04-16 | End: 2020-04-16

## 2020-04-16 RX ORDER — CEFAZOLIN SODIUM 2 G/100ML
2 INJECTION, SOLUTION INTRAVENOUS ONCE
Status: CANCELLED | OUTPATIENT
Start: 2020-04-16 | End: 2020-04-16

## 2020-04-16 RX ORDER — MISOPROSTOL 200 UG/1
800 TABLET ORAL AS NEEDED
Status: CANCELLED | OUTPATIENT
Start: 2020-04-16

## 2020-04-16 RX ORDER — CARBOPROST TROMETHAMINE 250 UG/ML
250 INJECTION, SOLUTION INTRAMUSCULAR AS NEEDED
Status: CANCELLED | OUTPATIENT
Start: 2020-04-16

## 2020-04-16 RX ORDER — SODIUM CHLORIDE 0.9 % (FLUSH) 0.9 %
10 SYRINGE (ML) INJECTION AS NEEDED
Status: CANCELLED | OUTPATIENT
Start: 2020-04-16

## 2020-04-16 RX ORDER — TRISODIUM CITRATE DIHYDRATE AND CITRIC ACID MONOHYDRATE 500; 334 MG/5ML; MG/5ML
30 SOLUTION ORAL ONCE
Status: CANCELLED | OUTPATIENT
Start: 2020-04-16 | End: 2020-04-16

## 2020-04-16 NOTE — DISCHARGE INSTRUCTIONS
What to know before your arrive:     -Do not eat, drink or chew gum after midnight the day before your procedure.    This also includes mints.   -Do not shave any part of your body including abdomen or pelvic are for two    days before your procedure.   -If you are taking a scheduled medication (insulin, blood pressure medicine,   antibiotics) please consult with your physician whether to take on the day of   surgery.   -Remove all jewelry including rings, wedding bands, and piercing before coming   to the hospital.   -Leave important valuables at home.   -Do not wear dark fingernail polish.   -Bring the following with you to the hospital:    -Picture ID and insurance, Medicare or Medicaid cards    -Co-pay/deductible required by insurance (Cash, Check, Credit Card)    -Copy of living will or power  document (if applicable)    -CPAP mask and tubing, not machine (if applicable)    -Skin prep instructions sheet    What to know the day of procedure:     -Park in the [facility], take elevator for first floor, exit to the right and  proceed through the doors to outside, follow the covered sidewalk to the  entrance of the [facility], follow the hallway and signs to the [facility],  enter the [place] to your right BEFORE entering the 1720 lobby.  Take the  elevators to the 3rd floor ([address] [place]).   -Leave unnecessary items in your vehicle, including your suitcase.  Your support  person or a family member can get it for you after your procedure.   -Check in at the reception desk in the lobby of the 3rd floor ([address] [place]).   -One person may accompany you to the pre-op/recovery area.  Please have  other family members wait in the waiting room.   -An anesthesiologist will meet with your prior to your procedure.   -After anesthesia has been initiated, one person may accompany you in the  operating room.   -No video cameras are permitted in the operating room; only still cameras,  Please.      What to  expect while you are in recovery:     -One person may stay with you while you are in recovery.   -If the baby is stable, he/she may visit to initiate breastfeeding & [facility].    THE FOLLOWING INFORMATION WAS PROVIDED TO PATIENT:     SECTION BOOKLET BY SANKET  PAIN MANAGEMENT   RESPIREX    CHLORHEXIDINE GLUCONATE WIPES AND INSTRUCTIONS GIVEN TO PATIENT

## 2020-04-16 NOTE — PAT
Patient to apply Chlorhexadine wipes  to surgical area (as instructed) the night before procedure and the AM of procedure. Wipes provided.    Instructed patient on visitor policy. Patient verbalized understanding.

## 2020-04-17 ENCOUNTER — ANESTHESIA EVENT (OUTPATIENT)
Dept: LABOR AND DELIVERY | Facility: HOSPITAL | Age: 40
End: 2020-04-17

## 2020-04-17 ENCOUNTER — HOSPITAL ENCOUNTER (INPATIENT)
Facility: HOSPITAL | Age: 40
LOS: 2 days | Discharge: HOME | End: 2020-04-19
Attending: OBSTETRICS & GYNECOLOGY | Admitting: OBSTETRICS & GYNECOLOGY

## 2020-04-17 ENCOUNTER — ANESTHESIA (OUTPATIENT)
Dept: LABOR AND DELIVERY | Facility: HOSPITAL | Age: 40
End: 2020-04-17

## 2020-04-17 DIAGNOSIS — Z3A.39 39 WEEKS GESTATION OF PREGNANCY: ICD-10-CM

## 2020-04-17 PROBLEM — Z34.90 PREGNANT: Status: ACTIVE | Noted: 2020-04-17

## 2020-04-17 PROCEDURE — 25010000003 MORPHINE PER 10 MG: Performed by: NURSE ANESTHETIST, CERTIFIED REGISTERED

## 2020-04-17 PROCEDURE — 25010000002 MIDAZOLAM PER 1 MG: Performed by: NURSE ANESTHETIST, CERTIFIED REGISTERED

## 2020-04-17 PROCEDURE — 25010000002 ONDANSETRON PER 1 MG: Performed by: NURSE ANESTHETIST, CERTIFIED REGISTERED

## 2020-04-17 PROCEDURE — 25010000002 PHENYLEPHRINE PER 1 ML: Performed by: NURSE ANESTHETIST, CERTIFIED REGISTERED

## 2020-04-17 PROCEDURE — 25010000003 CEFAZOLIN IN DEXTROSE 2-4 GM/100ML-% SOLUTION: Performed by: NURSE PRACTITIONER

## 2020-04-17 PROCEDURE — 25010000002 FENTANYL CITRATE (PF) 100 MCG/2ML SOLUTION: Performed by: NURSE ANESTHETIST, CERTIFIED REGISTERED

## 2020-04-17 PROCEDURE — 25010000002 METOCLOPRAMIDE PER 10 MG: Performed by: NURSE ANESTHETIST, CERTIFIED REGISTERED

## 2020-04-17 PROCEDURE — 25010000002 KETOROLAC TROMETHAMINE PER 15 MG: Performed by: NURSE ANESTHETIST, CERTIFIED REGISTERED

## 2020-04-17 PROCEDURE — 59025 FETAL NON-STRESS TEST: CPT

## 2020-04-17 DEVICE — HEMOST ABS SURGICEL PWDR 3GM: Type: IMPLANTABLE DEVICE | Status: FUNCTIONAL

## 2020-04-17 RX ORDER — BUPIVACAINE HYDROCHLORIDE 7.5 MG/ML
INJECTION, SOLUTION INTRASPINAL AS NEEDED
Status: DISCONTINUED | OUTPATIENT
Start: 2020-04-17 | End: 2020-04-17 | Stop reason: SURG

## 2020-04-17 RX ORDER — NALOXONE HCL 0.4 MG/ML
0.4 VIAL (ML) INJECTION
Status: DISCONTINUED | OUTPATIENT
Start: 2020-04-17 | End: 2020-04-19 | Stop reason: HOSPADM

## 2020-04-17 RX ORDER — IBUPROFEN 600 MG/1
600 TABLET ORAL EVERY 6 HOURS PRN
Status: DISCONTINUED | OUTPATIENT
Start: 2020-04-17 | End: 2020-04-19 | Stop reason: HOSPADM

## 2020-04-17 RX ORDER — OXYCODONE AND ACETAMINOPHEN 10; 325 MG/1; MG/1
1 TABLET ORAL EVERY 4 HOURS PRN
Status: DISCONTINUED | OUTPATIENT
Start: 2020-04-17 | End: 2020-04-19 | Stop reason: HOSPADM

## 2020-04-17 RX ORDER — METHYLERGONOVINE MALEATE 0.2 MG/ML
200 INJECTION INTRAVENOUS AS NEEDED
Status: DISCONTINUED | OUTPATIENT
Start: 2020-04-17 | End: 2020-04-19 | Stop reason: HOSPADM

## 2020-04-17 RX ORDER — DOCUSATE SODIUM 100 MG/1
100 CAPSULE, LIQUID FILLED ORAL 2 TIMES DAILY PRN
Status: DISCONTINUED | OUTPATIENT
Start: 2020-04-17 | End: 2020-04-19 | Stop reason: HOSPADM

## 2020-04-17 RX ORDER — FENTANYL CITRATE 50 UG/ML
INJECTION, SOLUTION INTRAMUSCULAR; INTRAVENOUS AS NEEDED
Status: DISCONTINUED | OUTPATIENT
Start: 2020-04-17 | End: 2020-04-17 | Stop reason: SURG

## 2020-04-17 RX ORDER — DIPHENHYDRAMINE HYDROCHLORIDE 50 MG/ML
25 INJECTION INTRAMUSCULAR; INTRAVENOUS EVERY 4 HOURS PRN
Status: DISCONTINUED | OUTPATIENT
Start: 2020-04-17 | End: 2020-04-19 | Stop reason: HOSPADM

## 2020-04-17 RX ORDER — FAMOTIDINE 10 MG/ML
INJECTION, SOLUTION INTRAVENOUS AS NEEDED
Status: DISCONTINUED | OUTPATIENT
Start: 2020-04-17 | End: 2020-04-17 | Stop reason: SURG

## 2020-04-17 RX ORDER — KETOROLAC TROMETHAMINE 30 MG/ML
30 INJECTION, SOLUTION INTRAMUSCULAR; INTRAVENOUS ONCE AS NEEDED
Status: COMPLETED | OUTPATIENT
Start: 2020-04-17 | End: 2020-04-17

## 2020-04-17 RX ORDER — CARBOPROST TROMETHAMINE 250 UG/ML
250 INJECTION, SOLUTION INTRAMUSCULAR ONCE AS NEEDED
Status: DISCONTINUED | OUTPATIENT
Start: 2020-04-17 | End: 2020-04-19 | Stop reason: HOSPADM

## 2020-04-17 RX ORDER — CEFAZOLIN SODIUM 2 G/100ML
2 INJECTION, SOLUTION INTRAVENOUS ONCE
Status: COMPLETED | OUTPATIENT
Start: 2020-04-17 | End: 2020-04-17

## 2020-04-17 RX ORDER — SIMETHICONE 80 MG
80 TABLET,CHEWABLE ORAL 4 TIMES DAILY PRN
Status: DISCONTINUED | OUTPATIENT
Start: 2020-04-17 | End: 2020-04-19 | Stop reason: HOSPADM

## 2020-04-17 RX ORDER — OXYTOCIN 10 [USP'U]/ML
INJECTION, SOLUTION INTRAMUSCULAR; INTRAVENOUS AS NEEDED
Status: DISCONTINUED | OUTPATIENT
Start: 2020-04-17 | End: 2020-04-17 | Stop reason: SURG

## 2020-04-17 RX ORDER — PROMETHAZINE HYDROCHLORIDE 12.5 MG/1
12.5 SUPPOSITORY RECTAL EVERY 6 HOURS PRN
Status: DISCONTINUED | OUTPATIENT
Start: 2020-04-17 | End: 2020-04-19 | Stop reason: HOSPADM

## 2020-04-17 RX ORDER — SODIUM CHLORIDE 0.9 % (FLUSH) 0.9 %
3 SYRINGE (ML) INJECTION EVERY 12 HOURS SCHEDULED
Status: DISCONTINUED | OUTPATIENT
Start: 2020-04-17 | End: 2020-04-17 | Stop reason: HOSPADM

## 2020-04-17 RX ORDER — PROMETHAZINE HYDROCHLORIDE 25 MG/ML
12.5 INJECTION, SOLUTION INTRAMUSCULAR; INTRAVENOUS EVERY 6 HOURS PRN
Status: DISCONTINUED | OUTPATIENT
Start: 2020-04-17 | End: 2020-04-19 | Stop reason: HOSPADM

## 2020-04-17 RX ORDER — CARBOPROST TROMETHAMINE 250 UG/ML
250 INJECTION, SOLUTION INTRAMUSCULAR AS NEEDED
Status: DISCONTINUED | OUTPATIENT
Start: 2020-04-17 | End: 2020-04-17 | Stop reason: HOSPADM

## 2020-04-17 RX ORDER — MISOPROSTOL 200 UG/1
800 TABLET ORAL AS NEEDED
Status: DISCONTINUED | OUTPATIENT
Start: 2020-04-17 | End: 2020-04-19 | Stop reason: HOSPADM

## 2020-04-17 RX ORDER — METOCLOPRAMIDE HYDROCHLORIDE 5 MG/ML
INJECTION INTRAMUSCULAR; INTRAVENOUS AS NEEDED
Status: DISCONTINUED | OUTPATIENT
Start: 2020-04-17 | End: 2020-04-17 | Stop reason: SURG

## 2020-04-17 RX ORDER — OXYTOCIN-SODIUM CHLORIDE 0.9% IV SOLN 30 UNIT/500ML 30-0.9/5 UT/ML-%
85 SOLUTION INTRAVENOUS ONCE
Status: DISCONTINUED | OUTPATIENT
Start: 2020-04-17 | End: 2020-04-17 | Stop reason: HOSPADM

## 2020-04-17 RX ORDER — TRISODIUM CITRATE DIHYDRATE AND CITRIC ACID MONOHYDRATE 500; 334 MG/5ML; MG/5ML
30 SOLUTION ORAL ONCE
Status: COMPLETED | OUTPATIENT
Start: 2020-04-17 | End: 2020-04-17

## 2020-04-17 RX ORDER — NALOXONE HCL 0.4 MG/ML
0.4 VIAL (ML) INJECTION
Status: DISCONTINUED | OUTPATIENT
Start: 2020-04-17 | End: 2020-04-18

## 2020-04-17 RX ORDER — METHYLERGONOVINE MALEATE 0.2 MG/ML
200 INJECTION INTRAVENOUS ONCE AS NEEDED
Status: DISCONTINUED | OUTPATIENT
Start: 2020-04-17 | End: 2020-04-17 | Stop reason: HOSPADM

## 2020-04-17 RX ORDER — PROMETHAZINE HYDROCHLORIDE 25 MG/1
25 TABLET ORAL EVERY 6 HOURS PRN
Status: DISCONTINUED | OUTPATIENT
Start: 2020-04-17 | End: 2020-04-19 | Stop reason: HOSPADM

## 2020-04-17 RX ORDER — OXYCODONE AND ACETAMINOPHEN 7.5; 325 MG/1; MG/1
1 TABLET ORAL ONCE AS NEEDED
Status: COMPLETED | OUTPATIENT
Start: 2020-04-17 | End: 2020-04-17

## 2020-04-17 RX ORDER — LANOLIN
CREAM (ML) TOPICAL
Status: DISCONTINUED | OUTPATIENT
Start: 2020-04-17 | End: 2020-04-19 | Stop reason: HOSPADM

## 2020-04-17 RX ORDER — HYDROMORPHONE HYDROCHLORIDE 1 MG/ML
0.5 INJECTION, SOLUTION INTRAMUSCULAR; INTRAVENOUS; SUBCUTANEOUS
Status: DISCONTINUED | OUTPATIENT
Start: 2020-04-17 | End: 2020-04-17 | Stop reason: HOSPADM

## 2020-04-17 RX ORDER — SODIUM CHLORIDE 0.9 % (FLUSH) 0.9 %
10 SYRINGE (ML) INJECTION AS NEEDED
Status: DISCONTINUED | OUTPATIENT
Start: 2020-04-17 | End: 2020-04-17 | Stop reason: HOSPADM

## 2020-04-17 RX ORDER — OXYTOCIN-SODIUM CHLORIDE 0.9% IV SOLN 30 UNIT/500ML 30-0.9/5 UT/ML-%
650 SOLUTION INTRAVENOUS ONCE
Status: DISCONTINUED | OUTPATIENT
Start: 2020-04-17 | End: 2020-04-17 | Stop reason: HOSPADM

## 2020-04-17 RX ORDER — OXYTOCIN-SODIUM CHLORIDE 0.9% IV SOLN 30 UNIT/500ML 30-0.9/5 UT/ML-%
SOLUTION INTRAVENOUS AS NEEDED
Status: DISCONTINUED | OUTPATIENT
Start: 2020-04-17 | End: 2020-04-17 | Stop reason: SURG

## 2020-04-17 RX ORDER — DIPHENHYDRAMINE HCL 25 MG
25 CAPSULE ORAL EVERY 4 HOURS PRN
Status: DISCONTINUED | OUTPATIENT
Start: 2020-04-17 | End: 2020-04-19 | Stop reason: HOSPADM

## 2020-04-17 RX ORDER — MORPHINE SULFATE 0.5 MG/ML
INJECTION, SOLUTION EPIDURAL; INTRATHECAL; INTRAVENOUS AS NEEDED
Status: DISCONTINUED | OUTPATIENT
Start: 2020-04-17 | End: 2020-04-17 | Stop reason: SURG

## 2020-04-17 RX ORDER — OXYCODONE HYDROCHLORIDE AND ACETAMINOPHEN 5; 325 MG/1; MG/1
1 TABLET ORAL EVERY 4 HOURS PRN
Status: DISCONTINUED | OUTPATIENT
Start: 2020-04-17 | End: 2020-04-19 | Stop reason: HOSPADM

## 2020-04-17 RX ORDER — MISOPROSTOL 200 UG/1
800 TABLET ORAL AS NEEDED
Status: DISCONTINUED | OUTPATIENT
Start: 2020-04-17 | End: 2020-04-17 | Stop reason: HOSPADM

## 2020-04-17 RX ORDER — ONDANSETRON 2 MG/ML
4 INJECTION INTRAMUSCULAR; INTRAVENOUS ONCE
Status: DISCONTINUED | OUTPATIENT
Start: 2020-04-17 | End: 2020-04-17 | Stop reason: HOSPADM

## 2020-04-17 RX ORDER — LIDOCAINE HYDROCHLORIDE 10 MG/ML
5 INJECTION, SOLUTION EPIDURAL; INFILTRATION; INTRACAUDAL; PERINEURAL AS NEEDED
Status: DISCONTINUED | OUTPATIENT
Start: 2020-04-17 | End: 2020-04-17 | Stop reason: HOSPADM

## 2020-04-17 RX ORDER — SODIUM CHLORIDE, SODIUM LACTATE, POTASSIUM CHLORIDE, CALCIUM CHLORIDE 600; 310; 30; 20 MG/100ML; MG/100ML; MG/100ML; MG/100ML
125 INJECTION, SOLUTION INTRAVENOUS CONTINUOUS
Status: DISCONTINUED | OUTPATIENT
Start: 2020-04-17 | End: 2020-04-18 | Stop reason: HOSPADM

## 2020-04-17 RX ORDER — MIDAZOLAM HYDROCHLORIDE 1 MG/ML
INJECTION INTRAMUSCULAR; INTRAVENOUS AS NEEDED
Status: DISCONTINUED | OUTPATIENT
Start: 2020-04-17 | End: 2020-04-17 | Stop reason: SURG

## 2020-04-17 RX ORDER — ONDANSETRON 2 MG/ML
INJECTION INTRAMUSCULAR; INTRAVENOUS AS NEEDED
Status: DISCONTINUED | OUTPATIENT
Start: 2020-04-17 | End: 2020-04-17 | Stop reason: SURG

## 2020-04-17 RX ORDER — MORPHINE SULFATE 2 MG/ML
2 INJECTION, SOLUTION INTRAMUSCULAR; INTRAVENOUS EVERY 4 HOURS PRN
Status: DISCONTINUED | OUTPATIENT
Start: 2020-04-17 | End: 2020-04-19 | Stop reason: HOSPADM

## 2020-04-17 RX ADMIN — PHENYLEPHRINE HYDROCHLORIDE 100 MCG: 10 INJECTION, SOLUTION INTRAMUSCULAR; INTRAVENOUS; SUBCUTANEOUS at 12:44

## 2020-04-17 RX ADMIN — MIDAZOLAM 1 MG: 1 INJECTION INTRAMUSCULAR; INTRAVENOUS at 12:34

## 2020-04-17 RX ADMIN — OXYCODONE HYDROCHLORIDE AND ACETAMINOPHEN 1 TABLET: 7.5; 325 TABLET ORAL at 14:45

## 2020-04-17 RX ADMIN — FAMOTIDINE 20 MG: 10 INJECTION, SOLUTION INTRAVENOUS at 12:34

## 2020-04-17 RX ADMIN — SODIUM CHLORIDE, POTASSIUM CHLORIDE, SODIUM LACTATE AND CALCIUM CHLORIDE 125 ML/HR: 600; 310; 30; 20 INJECTION, SOLUTION INTRAVENOUS at 10:50

## 2020-04-17 RX ADMIN — SODIUM CITRATE AND CITRIC ACID MONOHYDRATE 30 ML: 500; 334 SOLUTION ORAL at 12:31

## 2020-04-17 RX ADMIN — OXYCODONE HYDROCHLORIDE AND ACETAMINOPHEN 1 TABLET: 5; 325 TABLET ORAL at 23:46

## 2020-04-17 RX ADMIN — MORPHINE SULFATE 150 MCG: 0.5 INJECTION, SOLUTION EPIDURAL; INTRATHECAL; INTRAVENOUS at 12:38

## 2020-04-17 RX ADMIN — IBUPROFEN 600 MG: 600 TABLET, FILM COATED ORAL at 23:46

## 2020-04-17 RX ADMIN — PHENYLEPHRINE HYDROCHLORIDE 100 MCG: 10 INJECTION, SOLUTION INTRAMUSCULAR; INTRAVENOUS; SUBCUTANEOUS at 12:49

## 2020-04-17 RX ADMIN — OXYTOCIN 100 ML: 10 INJECTION INTRAVENOUS at 13:22

## 2020-04-17 RX ADMIN — PHENYLEPHRINE HYDROCHLORIDE 100 MCG: 10 INJECTION, SOLUTION INTRAMUSCULAR; INTRAVENOUS; SUBCUTANEOUS at 12:58

## 2020-04-17 RX ADMIN — ONDANSETRON 4 MG: 2 INJECTION INTRAMUSCULAR; INTRAVENOUS at 12:34

## 2020-04-17 RX ADMIN — METOCLOPRAMIDE 10 MG: 5 INJECTION, SOLUTION INTRAMUSCULAR; INTRAVENOUS at 12:42

## 2020-04-17 RX ADMIN — OXYTOCIN 3 UNITS: 10 INJECTION, SOLUTION INTRAMUSCULAR; INTRAVENOUS at 12:56

## 2020-04-17 RX ADMIN — SIMETHICONE CHEW TAB 80 MG 80 MG: 80 TABLET ORAL at 18:54

## 2020-04-17 RX ADMIN — SODIUM CHLORIDE, POTASSIUM CHLORIDE, SODIUM LACTATE AND CALCIUM CHLORIDE 2000 ML/HR: 600; 310; 30; 20 INJECTION, SOLUTION INTRAVENOUS at 12:13

## 2020-04-17 RX ADMIN — KETOROLAC TROMETHAMINE 30 MG: 30 INJECTION, SOLUTION INTRAMUSCULAR at 14:45

## 2020-04-17 RX ADMIN — BUPIVACAINE HYDROCHLORIDE IN DEXTROSE 1.3 ML: 7.5 INJECTION, SOLUTION SUBARACHNOID at 12:38

## 2020-04-17 RX ADMIN — SODIUM CHLORIDE, POTASSIUM CHLORIDE, SODIUM LACTATE AND CALCIUM CHLORIDE: 600; 310; 30; 20 INJECTION, SOLUTION INTRAVENOUS at 13:22

## 2020-04-17 RX ADMIN — OXYTOCIN 500 ML: 10 INJECTION INTRAVENOUS at 12:55

## 2020-04-17 RX ADMIN — OXYCODONE HYDROCHLORIDE AND ACETAMINOPHEN 1 TABLET: 5; 325 TABLET ORAL at 18:54

## 2020-04-17 RX ADMIN — PHENYLEPHRINE HYDROCHLORIDE 100 MCG: 10 INJECTION, SOLUTION INTRAMUSCULAR; INTRAVENOUS; SUBCUTANEOUS at 13:09

## 2020-04-17 RX ADMIN — FENTANYL CITRATE 15 MCG: 50 INJECTION, SOLUTION INTRAMUSCULAR; INTRAVENOUS at 12:38

## 2020-04-17 RX ADMIN — SODIUM CHLORIDE, POTASSIUM CHLORIDE, SODIUM LACTATE AND CALCIUM CHLORIDE 125 ML/HR: 600; 310; 30; 20 INJECTION, SOLUTION INTRAVENOUS at 22:28

## 2020-04-17 RX ADMIN — CEFAZOLIN SODIUM 2 G: 2 INJECTION, SOLUTION INTRAVENOUS at 12:17

## 2020-04-17 RX ADMIN — PHENYLEPHRINE HYDROCHLORIDE 100 MCG: 10 INJECTION, SOLUTION INTRAMUSCULAR; INTRAVENOUS; SUBCUTANEOUS at 13:14

## 2020-04-17 NOTE — ANESTHESIA PROCEDURE NOTES
Spinal Block      Patient reassessed immediately prior to procedure    Patient location during procedure: OR  Indication:at surgeon's request  Performed By  CRNA: Vibha Ruiz CRNA  Preanesthetic Checklist  Completed: patient identified, site marked, surgical consent, pre-op evaluation, timeout performed, IV checked, risks and benefits discussed and monitors and equipment checked  Spinal Block Prep:  Patient Position:sitting  Sterile Tech:cap, gloves, mask and sterile barriers  Prep:Betadine  Patient Monitoring:blood pressure monitoring, continuous pulse oximetry and EKG  Spinal Block Procedure  Approach:midline  Guidance:palpation technique  Location:L4-L5  Needle Type:Jacki  Needle Gauge:25 G  Placement of Spinal needle event:cerebrospinal fluid aspirated  Paresthesia: no  Fluid Appearance:clear     Post Assessment  Patient Tolerance:patient tolerated the procedure well with no apparent complications  Complications no

## 2020-04-17 NOTE — H&P
Subjective     No chief complaint on file.      Guerita Perla is a 39 y.o. year old  with an Estimated Date of Delivery: 20 currently at 39w2d presenting with no complaints.    Prenatal care has been with Dr. watson.  It has been significant for malpresentation (breech).    No Additional Complaints Reported    The following portions of the patient's history were reviewed and updated as appropriate:vital signs, allergies, current medications, past medical history, past social history, past surgical history and problem list.    Review of Systems  Pertinent items are noted in HPI.     Objective     /78   Temp 97.9 °F (36.6 °C) (Oral)   Resp 16     Physical Exam    General:  well developed; well nourished  no acute distress           Abdomen: no umbilical or inguinal hernias are present  no hepato-splenomegaly       FHT's: reactive and category 1       Assessment/Plan     ASSESSMENT  1. IUP at 39w2d  2. Breech, declines ECV    PLAN  1. 1 c/s.         Aleena Watson MD  2020@

## 2020-04-17 NOTE — ANESTHESIA PREPROCEDURE EVALUATION
Anesthesia Evaluation     Patient summary reviewed and Nursing notes reviewed   NPO Solid Status: > 8 hours  NPO Liquid Status: > 8 hours           Airway   Mallampati: II  TM distance: >3 FB  Neck ROM: full  Dental      Pulmonary - negative pulmonary ROS   Cardiovascular - negative cardio ROS        Neuro/Psych  (+) numbness (cervical radiculopathy), psychiatric history Anxiety,     GI/Hepatic/Renal/Endo    (+)  GERD,  renal disease stones,     Musculoskeletal (-) negative ROS    Abdominal    Substance History - negative use     OB/GYN    (+) Pregnant,         Other - negative ROS                       Anesthesia Plan    ASA 2     ITN and spinal       Anesthetic plan, all risks, benefits, and alternatives have been provided, discussed and informed consent has been obtained with: patient.

## 2020-04-17 NOTE — ANESTHESIA POSTPROCEDURE EVALUATION
Patient: Guerita Perla    Procedure Summary     Date:  20 Room / Location:  [facility] LABOR DELIVERY   [facility]IV[facility]    Anesthesia Start:  1233 Anesthesia Stop:      Procedure:   SECTION PRIMARY (N/A Abdomen) Diagnosis:      Surgeon:  Aleena Stringer MD Provider:  Myron Jackson MD    Anesthesia Type:  ITN, spinal ASA Status:  2          Anesthesia Type: ITN, spinal    Vitals  Vitals Value Taken Time   BP     Temp     Pulse     Resp     SpO2 98 % 2020  1:30 PM   Vitals shown include unvalidated device data.    1332  97.4  198  98%  94/52  91    Post Anesthesia Care and Evaluation    Patient location during evaluation: bedside  Patient participation: complete - patient participated  Level of consciousness: awake and alert  Pain management: adequate  Airway patency: patent  Anesthetic complications: No anesthetic complications    Cardiovascular status: acceptable  Respiratory status: acceptable  Hydration status: acceptable

## 2020-04-17 NOTE — OP NOTE
Operative Note    Patient name: Guerita Perla  YOB: 1980   MRN: [id number]  Admission Date: 2020  Referring Provider: Aleena Stringer MD    ID: 39 y.o.  at 39w2d    Preoperative Diagnosis:   Patient Active Problem List   Diagnosis   • Cervical radiculopathy   • Pharyngoesophageal dysphagia   • Gastroesophageal reflux disease   • Pregnancy   • Abdominal pain   • Ureteral stone   Breech    Postoperative Diagnosis: Same as above.    Procedure(s): primarylow transverse  delivery     Surgeons: Surgeon(s) and Role:     * Aleena Stringer MD - Primary    Anesthesia: Spinal    Estimated Blood Loss: 1000 mL        Preoperative antibiotic: Ancef (cefazolin) 2 grams    Blood products:   Blood Administration Record (From admission, onward)    None          Pathology: * No orders in the log *    Drains: Metcalf catheter to gravity    Complications: None    Condition: Stable to recovery room                                          Infant:                 Gender: male  infant    Weight: 4272 g (9 lb 6.7 oz)     Apgars:    @ 1 minute /     9   @ 5 minutes    Cord gases: Venous:  No components found for:  PHCVEN,  BECVEN      Arterial:  No components found for:  PHCART,  BECART          Operative Summary:   After obtaining informed consent the patient was taken to the operating room where adequate anesthesia was obtained.  Metcalf catheter was placed in the bladder preoperatively.  IV antibiotics were given preoperatively.       The abdomen was prepped and draped in the usual sterile fashion for  delivery.  After confirming adequate anesthesia a Pfannenstiel skin incision was made with the scalpel and carried through to the underlying layer of fascia.  The fascia was incised in the midline and the incision extended laterally with the Madison scissors and with blunt dissection.       The upper aspect of the fascia was grasped with 2 Kocher clamps, elevated, and dissected off the underlying rectus  muscles bluntly and with the Madison scissors.  The Kocher clamps were removed and applied to the inferior aspect of the fascia.  The fascia was dissected off of the rectus muscles in the same fashion.  The peritoneum was entered bluntly.  The incision was stretched and the bladder blade and Dickinson retractor inserted for visualization of the uterus. A bladder flap was made and bladder blade replaced.        The uterus was incised with the scalpel in a low transverse fashion.  The uterine incision was entered digitally and the incision extended bluntly in a cranial-caudal fashion.  Retractors were removed and membranes were ruptured.  The infant was delivered atraumatically from breech presentation.  The umbilical cord was milked 4 times, clamped and cut and the nose and mouth bulb suctioned.  The infant was handed off to waiting pediatric staff.       Cord blood gases were not collected.  Cord blood was collected.  The placenta was removed using cord traction and uterine massage.  The uterus was exteriorized and cleared of all clots and debris.  The uterine incision was repaired with #1 chromic in a running locked fashion. A double layer technique was used.  Additional hemostatic measures required: figure of 8 stiches on left corner.    The incision was inspected and excellent hemostasis was noted.  The tubes and ovaries were noted to be normal.   The uterus was returned to the abdomen.  The gutters were cleared of all clots and debris.  Irrigation was used.  The uterine incision was again inspected and found to be hemostatic.       The peritoneum was reapproximated with 2-0 vicryl in a running fashion.  The fascia was closed with 0 vicryl in a running fashion.  The subcutaneous space was reapproximated using 3-0 plain gut in interrupted stiches.      The skin was closed using staples, and dressing placed. All sharp, instrument, and sponge counts were correct. The patient was transferred to the recovery room in  stable condition.    Aleena Stringer MD  4/17/2020

## 2020-04-18 LAB
BASOPHILS # BLD AUTO: 0.02 10*3/MM3 (ref 0–0.2)
BASOPHILS NFR BLD AUTO: 0.2 % (ref 0–1.5)
DEPRECATED RDW RBC AUTO: 49.2 FL (ref 37–54)
EOSINOPHIL # BLD AUTO: 0.06 10*3/MM3 (ref 0–0.4)
EOSINOPHIL NFR BLD AUTO: 0.7 % (ref 0.3–6.2)
ERYTHROCYTE [DISTWIDTH] IN BLOOD BY AUTOMATED COUNT: 13.8 % (ref 12.3–15.4)
HCT VFR BLD AUTO: 26.9 % (ref 34–46.6)
HGB BLD-MCNC: 8.7 G/DL (ref 12–15.9)
IMM GRANULOCYTES # BLD AUTO: 0.03 10*3/MM3 (ref 0–0.05)
IMM GRANULOCYTES NFR BLD AUTO: 0.3 % (ref 0–0.5)
LYMPHOCYTES # BLD AUTO: 1.1 10*3/MM3 (ref 0.7–3.1)
LYMPHOCYTES NFR BLD AUTO: 12.4 % (ref 19.6–45.3)
MCH RBC QN AUTO: 31.3 PG (ref 26.6–33)
MCHC RBC AUTO-ENTMCNC: 32.3 G/DL (ref 31.5–35.7)
MCV RBC AUTO: 96.8 FL (ref 79–97)
MONOCYTES # BLD AUTO: 0.66 10*3/MM3 (ref 0.1–0.9)
MONOCYTES NFR BLD AUTO: 7.5 % (ref 5–12)
NEUTROPHILS # BLD AUTO: 6.97 10*3/MM3 (ref 1.7–7)
NEUTROPHILS NFR BLD AUTO: 78.9 % (ref 42.7–76)
NRBC BLD AUTO-RTO: 0 /100 WBC (ref 0–0.2)
PLATELET # BLD AUTO: 120 10*3/MM3 (ref 140–450)
PMV BLD AUTO: 11.6 FL (ref 6–12)
RBC # BLD AUTO: 2.78 10*6/MM3 (ref 3.77–5.28)
WBC NRBC COR # BLD: 8.84 10*3/MM3 (ref 3.4–10.8)

## 2020-04-18 PROCEDURE — 85025 COMPLETE CBC W/AUTO DIFF WBC: CPT | Performed by: OBSTETRICS & GYNECOLOGY

## 2020-04-18 RX ADMIN — SIMETHICONE CHEW TAB 80 MG 80 MG: 80 TABLET ORAL at 17:58

## 2020-04-18 RX ADMIN — IBUPROFEN 600 MG: 600 TABLET, FILM COATED ORAL at 12:24

## 2020-04-18 RX ADMIN — OXYCODONE HYDROCHLORIDE AND ACETAMINOPHEN 1 TABLET: 5; 325 TABLET ORAL at 06:15

## 2020-04-18 RX ADMIN — OXYCODONE HYDROCHLORIDE AND ACETAMINOPHEN 1 TABLET: 5; 325 TABLET ORAL at 20:21

## 2020-04-18 RX ADMIN — OXYCODONE HYDROCHLORIDE AND ACETAMINOPHEN 1 TABLET: 5; 325 TABLET ORAL at 10:39

## 2020-04-18 RX ADMIN — IBUPROFEN 600 MG: 600 TABLET, FILM COATED ORAL at 20:21

## 2020-04-18 RX ADMIN — IBUPROFEN 600 MG: 600 TABLET, FILM COATED ORAL at 06:15

## 2020-04-18 RX ADMIN — OXYCODONE HYDROCHLORIDE AND ACETAMINOPHEN 1 TABLET: 5; 325 TABLET ORAL at 15:05

## 2020-04-18 RX ADMIN — SIMETHICONE CHEW TAB 80 MG 80 MG: 80 TABLET ORAL at 08:28

## 2020-04-18 RX ADMIN — POLYETHYLENE GLYCOL 3350 17 G: 17 POWDER, FOR SOLUTION ORAL at 08:28

## 2020-04-18 NOTE — PROGRESS NOTES
2020    Name:Guerita Perla    MR#:[id number]     PROGRESS NOTE:  Post-Op 1 S/P    HD:1    Subjective   39 y.o. yo Female  s/p CS at 39w2d doing well. Pain well controlled. Tolerating regular diet and having flatus. Lochia normal.     Patient Active Problem List   Diagnosis   • Cervical radiculopathy   • Pharyngoesophageal dysphagia   • Gastroesophageal reflux disease   • Pregnancy   • Abdominal pain   • Ureteral stone   •  delivery delivered   • Pregnant        Objective    Vitals  Temp:  Temp:  [97.4 °F (36.3 °C)-98.2 °F (36.8 °C)] 98.1 °F (36.7 °C)  Temp src: Oral  BP:  BP: ()/(50-78) 108/56  Pulse:  Heart Rate:  [] 72  RR:   Resp:  [16-18] 16    General Awake, alert, no distress  Abdomen Soft, non-distended, fundus firm, below umbilicus, appropriately tender  Incision  Bandage with small amount of shadowing  Extremities Calves NT bilaterally     I/O last 3 completed shifts:  In: 1400 [I.V.:1400]  Out: 3170 [Urine:2150; Blood:1020]    LABS:   Lab Results   Component Value Date    WBC 8.84 2020    HGB 8.7 (L) 2020    HCT 26.9 (L) 2020    MCV 96.8 2020     (L) 2020       Infant: male       Assessment   1.  POD 1    Plan: Doing well.  Routine postoperative care      Active Problems:   None      Yulissa Back MD  2020 10:09

## 2020-04-18 NOTE — PLAN OF CARE
Vss, fundus and lochia wdl, surgical dressing dry and intact with dry drainage, bottle feeding, iv infusing, no s/s of infiltration, pain has been controlled with prn pain medication.

## 2020-04-18 NOTE — ANESTHESIA POSTPROCEDURE EVALUATION
Patient: Guerita Perla    Procedure Summary     Date:  20 Room / Location:  [facility] LABOR DELIVERY   [facility] LABOR DELIVERY    Anesthesia Start:  1233 Anesthesia Stop:  133    Procedure:   SECTION PRIMARY (N/A Abdomen) Diagnosis:      Surgeon:  Aleena Stringer MD Provider:  Myron Jackson MD    Anesthesia Type:  ITN, spinal ASA Status:  2          Anesthesia Type: ITN, spinal    Vitals  Vitals Value Taken Time   /56 2020  7:00 AM   Temp 98.1 °F (36.7 °C) 2020  7:00 AM   Pulse 72 2020  7:00 AM   Resp 16 2020  7:00 AM   SpO2 99 % 2020  2:51 PM   Vitals shown include unvalidated device data.        Post Anesthesia Care and Evaluation    Patient location during evaluation: bedside  Patient participation: complete - patient participated  Level of consciousness: awake and awake and alert  Pain score: 0  Pain management: satisfactory to patient  Airway patency: patent  Anesthetic complications: No anesthetic complications  PONV Status: none  Cardiovascular status: acceptable  Respiratory status: acceptable  Hydration status: acceptable  Post Neuraxial Block status: Motor and sensory function returned to baseline and No signs or symptoms of PDPH

## 2020-04-19 VITALS
SYSTOLIC BLOOD PRESSURE: 106 MMHG | DIASTOLIC BLOOD PRESSURE: 67 MMHG | OXYGEN SATURATION: 100 % | HEART RATE: 61 BPM | RESPIRATION RATE: 16 BRPM | TEMPERATURE: 98.1 F

## 2020-04-19 RX ORDER — IBUPROFEN 600 MG/1
600 TABLET ORAL EVERY 6 HOURS PRN
Qty: 35 TABLET | Refills: 1 | Status: SHIPPED | OUTPATIENT
Start: 2020-04-19

## 2020-04-19 RX ORDER — OXYCODONE HYDROCHLORIDE AND ACETAMINOPHEN 5; 325 MG/1; MG/1
1 TABLET ORAL EVERY 4 HOURS PRN
Qty: 30 TABLET | Refills: 0 | Status: SHIPPED | OUTPATIENT
Start: 2020-04-19 | End: 2020-04-27

## 2020-04-19 RX ADMIN — OXYCODONE HYDROCHLORIDE AND ACETAMINOPHEN 1 TABLET: 5; 325 TABLET ORAL at 09:34

## 2020-04-19 RX ADMIN — IBUPROFEN 600 MG: 600 TABLET, FILM COATED ORAL at 13:38

## 2020-04-19 RX ADMIN — OXYCODONE HYDROCHLORIDE AND ACETAMINOPHEN 1 TABLET: 5; 325 TABLET ORAL at 04:53

## 2020-04-19 RX ADMIN — IBUPROFEN 600 MG: 600 TABLET, FILM COATED ORAL at 04:53

## 2020-04-19 RX ADMIN — OXYCODONE HYDROCHLORIDE AND ACETAMINOPHEN 1 TABLET: 5; 325 TABLET ORAL at 13:38

## 2020-04-19 RX ADMIN — OXYCODONE HYDROCHLORIDE AND ACETAMINOPHEN 1 TABLET: 5; 325 TABLET ORAL at 00:44

## 2020-04-19 RX ADMIN — POLYETHYLENE GLYCOL 3350 17 G: 17 POWDER, FOR SOLUTION ORAL at 08:30

## 2020-04-19 NOTE — PLAN OF CARE
Problem: Patient Care Overview  Goal: Plan of Care Review  Outcome: Ongoing (interventions implemented as appropriate)  Flowsheets (Taken 4/19/2020 0210)  Progress: improving  Plan of Care Reviewed With: patient  Outcome Summary: Pt is keeping pain controlled w/ meds.  Incision CDI, light lochia, and VSS.  Bonding well w/ baby.

## 2020-04-19 NOTE — DISCHARGE SUMMARY
Discharge Summary    Date of Admission: 2020  Date of Discharge:  2020      Patient: Guerita Perla      MR#:[id number]    Primary Surgeon/OB: Aleena Stringer MD    Discharge Surgeon/OB:    Presenting Problem/History of Present Illness  Pregnant [Z34.90]     Patient Active Problem List   Diagnosis   • Cervical radiculopathy   • Pharyngoesophageal dysphagia   • Gastroesophageal reflux disease   • Pregnancy   • Abdominal pain   • Ureteral stone   •  delivery delivered   • Pregnant         Discharge Diagnosis:  section at 39w2d    Procedures:  , Low Transverse     2020    12:53 PM        Discharge Date: 2020; Discharge Time: 13:15    Hospital Course  Patient is a 39 y.o. female  at 39w2d status post  section with uneventful postoperative recovery.  Patient was advanced to regular diet on postoperative day#1.  On discharge, ambulating, tolerating a regular diet without any difficulties and her incision is dry, clean and intact.     Infant:   male  fetus 4272 g (9 lb 6.7 oz)  with Apgar scores of 8  , 9   at five minutes.    Condition on Discharge:  Stable    Vital Signs  Temp:  [97.9 °F (36.6 °C)-98.2 °F (36.8 °C)] 98.1 °F (36.7 °C)  Heart Rate:  [61-70] 61  Resp:  [16] 16  BP: (102-106)/(55-67) 106/67    Lab Results   Component Value Date    WBC 8.84 2020    HGB 8.7 (L) 2020    HCT 26.9 (L) 2020    MCV 96.8 2020     (L) 2020       Discharge Disposition  Home or Self Care    Discharge Medications     Discharge Medications      New Medications      Instructions Start Date   ibuprofen 600 MG tablet  Commonly known as:  ADVIL,MOTRIN   600 mg, Oral, Every 6 Hours PRN      oxyCODONE-acetaminophen 5-325 MG per tablet  Commonly known as:  PERCOCET   1 tablet, Oral, Every 4 Hours PRN         Continue These Medications      Instructions Start Date   docusate sodium 100 MG capsule  Commonly known as:  COLACE   100 mg, Oral, Daily       PRENATAL FA PO   1 tablet, Oral, Daily         Stop These Medications    acetaminophen 500 MG tablet  Commonly known as:  TYLENOL     omeprazole 20 MG capsule  Commonly known as:  priLOSEC            Discharge Diet:     Activity at Discharge:   Activity Instructions     Pelvic Rest      For 6 weeks           Follow-up Appointments  No future appointments.  Additional Instructions for the Follow-ups that You Need to Schedule     Call MD for problems / concerns.   As directed      Please call with concerns of depression.    Order Comments:  Please call with concerns of depression.          Discharge Follow-up with Specialty: shirley; 2 Weeks   As directed      Specialty:  shirley    Follow Up:  2 Weeks               Yulissa Back MD  04/19/20  13:15  Csd

## 2020-04-19 NOTE — PLAN OF CARE
Problem: Patient Care Overview  Goal: Plan of Care Review  Outcome: Outcome(s) achieved  Flowsheets (Taken 2020 1323)  Plan of Care Reviewed With: patient  Goal: Individualization and Mutuality  Outcome: Outcome(s) achieved  Goal: Discharge Needs Assessment  Outcome: Outcome(s) achieved  Goal: Interprofessional Rounds/Family Conf  Outcome: Outcome(s) achieved     Problem: Postpartum ( Delivery) (Adult,Obstetrics,Pediatric)  Goal: Signs and Symptoms of Listed Potential Problems Will be Absent, Minimized or Managed (Postpartum)  Outcome: Outcome(s) achieved

## 2022-03-21 ENCOUNTER — OFFICE VISIT (OUTPATIENT)
Dept: OBSTETRICS AND GYNECOLOGY | Facility: CLINIC | Age: 42
End: 2022-03-21

## 2022-03-21 VITALS
HEIGHT: 62 IN | WEIGHT: 143.6 LBS | BODY MASS INDEX: 26.43 KG/M2 | DIASTOLIC BLOOD PRESSURE: 72 MMHG | SYSTOLIC BLOOD PRESSURE: 110 MMHG

## 2022-03-21 DIAGNOSIS — Z01.419 WOMEN'S ANNUAL ROUTINE GYNECOLOGICAL EXAMINATION: Primary | ICD-10-CM

## 2022-03-21 PROBLEM — Z34.90 PREGNANCY: Status: RESOLVED | Noted: 2020-02-14 | Resolved: 2022-03-21

## 2022-03-21 PROBLEM — R10.9 ABDOMINAL PAIN: Status: RESOLVED | Noted: 2020-02-14 | Resolved: 2022-03-21

## 2022-03-21 PROBLEM — Z34.90 PREGNANT: Status: RESOLVED | Noted: 2020-04-17 | Resolved: 2022-03-21

## 2022-03-21 PROCEDURE — 99396 PREV VISIT EST AGE 40-64: CPT | Performed by: OBSTETRICS & GYNECOLOGY

## 2022-03-21 RX ORDER — ESCITALOPRAM OXALATE 10 MG/1
10 TABLET ORAL DAILY
COMMUNITY
Start: 2022-02-19

## 2022-03-21 NOTE — PROGRESS NOTES
GYN Annual Exam     CC - Here for annual exam.        HPI  Guerita Perla is a 41 y.o. female, , who presents for annual well woman exam. Patient's last menstrual period was 2022 (within days)..  Periods are regular every 25-35 days, lasting 5 days. .  Dysmenorrhea:moderate, occurring first 1-2 days of flow.  Patient reports problems with: bloating, diarrhea, heavy bleeding (periods have become heavier recently), irritability, moodiness and pelvic pain. There were no changes to her medical or surgical history since her last visit.. Partner Status: Marital Status: .  New Partners since last visit: no.  Desires STD Screening: no.    She had labs with her PCP that were normal, and was started on lexapro around 6 months ago, but is only taking 5mg.   They have 1 embryo left and they feel undecided about using it.     Additional OB/GYN History   Current contraception: contraceptive methods: None  Desires to: do not start contraception  Last Pap : 20. Result: HPV regardless negative.  Last Completed Pap Smear     This patient has no relevant Health Maintenance data.        History of abnormal Pap smear: yes - at least 10 years ago. atypical cells.   Family history of uterine, colon, breast, or ovarian cancer: no  Performs monthly Self-Breast Exam: no  Last mammogram: . Done in [place], [place] [facility]. (mass was in left breast, BX was done and everything came back normal.)    Last Completed Mammogram     This patient has no relevant Health Maintenance data.         Exercises Regularly: yes  Feelings of Anxiety or Depression: yes - anxiety  Tobacco Usage?: No   OB History        3    Para   2    Term   2       0    AB   1    Living   2       SAB   1    IAB   0    Ectopic   0    Molar   0    Multiple   0    Live Births   2                Health Maintenance   Topic Date Due   • Annual Gynecologic Pelvic and Breast Exam  Never done   • ANNUAL PHYSICAL  Never done   • TDAP/TD  "VACCINES (1 - Tdap) Never done   • HEPATITIS C SCREENING  Never done   • PAP SMEAR  Never done   • COVID-19 Vaccine (3 - Booster for Pfizer series) 07/13/2021   • INFLUENZA VACCINE  Never done   • Pneumococcal Vaccine 0-64  Aged Out       The additional following portions of the patient's history were reviewed and updated as appropriate: allergies, current medications, past family history, past medical history, past social history, past surgical history and problem list.    Review of Systems      I have reviewed and agree with the HPI, ROS, and historical information as entered above. Aleena Stringer MD    Objective   /72   Ht 158 cm (62.21\")   Wt 65.1 kg (143 lb 9.6 oz)   LMP 03/03/2022 (Within Days)   BMI 26.09 kg/m²     Physical Exam  Vitals and nursing note reviewed. Exam conducted with a chaperone present.   Constitutional:       Appearance: She is well-developed.   HENT:      Head: Normocephalic and atraumatic.   Eyes:      Pupils: Pupils are equal, round, and reactive to light.   Neck:      Thyroid: No thyroid mass or thyromegaly.   Pulmonary:      Effort: Pulmonary effort is normal. No retractions.   Chest:      Chest wall: No mass.   Breasts:      Right: Normal. No mass, nipple discharge, skin change or tenderness.      Left: Normal. No mass, nipple discharge, skin change or tenderness.       Abdominal:      General: Bowel sounds are normal.      Palpations: Abdomen is soft. Abdomen is not rigid. There is no mass.      Tenderness: There is no abdominal tenderness. There is no guarding.      Hernia: No hernia is present. There is no hernia in the left inguinal area or right inguinal area.   Genitourinary:     Exam position: Lithotomy position.      Pubic Area: No rash.       Labia:         Right: No rash, tenderness or lesion.         Left: No rash, tenderness or lesion.       Urethra: No urethral pain or urethral swelling.      Vagina: Normal. No vaginal discharge or lesions.      Cervix: No " cervical motion tenderness, discharge, lesion or cervical bleeding.      Uterus: Normal. Not enlarged, not fixed and not tender.       Adnexa:         Right: No mass, tenderness or fullness.          Left: No mass, tenderness or fullness.        Rectum: No external hemorrhoid.   Musculoskeletal:      Cervical back: Normal range of motion. No muscular tenderness.      Right lower leg: No edema.      Left lower leg: No edema.   Skin:     General: Skin is warm and dry.   Neurological:      Mental Status: She is alert and oriented to person, place, and time.      Motor: Motor function is intact.   Psychiatric:         Mood and Affect: Mood and affect normal.         Behavior: Behavior normal.            Assessment and Plan    Problem List Items Addressed This Visit    None     Visit Diagnoses     Women's annual routine gynecological examination    -  Primary    Relevant Orders    Mammo Screening Digital Tomosynthesis Bilateral With CAD          1. GYN annual well woman exam.   2. She will increase her lexapro to 10mg. Discussed option of low dose ocp to help with pms symptoms/ periods, but she wants to make deicion about last embryo first.   3. Reviewed monthly self breast exams.  Instructed to call with lumps, pain, or breast discharge.    4. Ordered Mammogram today  5. Reccommended Flu Vaccine in Fall of each year.  6. RTC in 1 year or PRN with problems.  7. Return in about 1 year (around 3/21/2023) for Annual physical, Schedule ALAYNA.     Aleena Stringer MD  03/21/2022

## 2023-09-25 ENCOUNTER — TRANSCRIBE ORDERS (OUTPATIENT)
Dept: ADMINISTRATIVE | Facility: HOSPITAL | Age: 43
End: 2023-09-25
Payer: COMMERCIAL

## 2023-09-26 ENCOUNTER — TRANSCRIBE ORDERS (OUTPATIENT)
Dept: ADMINISTRATIVE | Facility: HOSPITAL | Age: 43
End: 2023-09-26
Payer: COMMERCIAL

## 2023-09-26 DIAGNOSIS — K46.9 ABDOMINAL HERNIA WITHOUT OBSTRUCTION AND WITHOUT GANGRENE, RECURRENCE NOT SPECIFIED, UNSPECIFIED HERNIA TYPE: ICD-10-CM

## 2023-09-26 DIAGNOSIS — K46.9 HERNIA OF ABDOMINAL CAVITY: Primary | ICD-10-CM

## 2023-10-02 ENCOUNTER — TRANSCRIBE ORDERS (OUTPATIENT)
Dept: ADMINISTRATIVE | Facility: HOSPITAL | Age: 43
End: 2023-10-02
Payer: COMMERCIAL

## 2023-10-02 DIAGNOSIS — K46.9 ABDOMINAL HERNIA WITHOUT OBSTRUCTION AND WITHOUT GANGRENE, RECURRENCE NOT SPECIFIED, UNSPECIFIED HERNIA TYPE: Primary | ICD-10-CM

## 2023-10-26 ENCOUNTER — HOSPITAL ENCOUNTER (OUTPATIENT)
Dept: CT IMAGING | Facility: HOSPITAL | Age: 43
Discharge: HOME | End: 2023-10-26
Admitting: INTERNAL MEDICINE
Payer: COMMERCIAL

## 2023-10-26 DIAGNOSIS — K46.9 ABDOMINAL HERNIA WITHOUT OBSTRUCTION AND WITHOUT GANGRENE, RECURRENCE NOT SPECIFIED, UNSPECIFIED HERNIA TYPE: ICD-10-CM

## 2023-10-26 PROCEDURE — 25510000001 IOPAMIDOL 61 % SOLUTION: Performed by: INTERNAL MEDICINE

## 2023-10-26 PROCEDURE — 74178 CT ABD&PLV WO CNTR FLWD CNTR: CPT

## 2023-10-26 RX ADMIN — IOPAMIDOL 85 ML: 612 INJECTION, SOLUTION INTRAVENOUS at 10:48

## (undated) DEVICE — SUT VIC 2/0 CT1 27IN J339H BX/36

## (undated) DEVICE — PROXIMATE RH ROTATING HEAD SKIN STAPLERS (35 WIDE) CONTAINS 35 STAINLESS STEEL STAPLES: Brand: PROXIMATE

## (undated) DEVICE — STPLR SKIN SUBCUTICULAR INSORB 2030

## (undated) DEVICE — Device: Brand: DEFENDO AIR/WATER/SUCTION AND BIOPSY VALVE

## (undated) DEVICE — SOL IRR NACL 0.9PCT BT 1000ML

## (undated) DEVICE — SOL IRR H2O BTL 1000ML STRL

## (undated) DEVICE — COATED VICRYL  (POLYGLACTIN 910) SUTURE, VIOLET BRAIDED, STERILE, SYNTHETIC ABSORBABLE SUTURE: Brand: COATED VICRYL

## (undated) DEVICE — TUBING, SUCTION, 1/4" X 10', STRAIGHT: Brand: MEDLINE

## (undated) DEVICE — SUT GUT CHRM 1 CTX 36IN 905H

## (undated) DEVICE — TBG 02 CRUSH RESIST LF CLR 7FT

## (undated) DEVICE — BITEBLOCK OMNI BLOC

## (undated) DEVICE — PK C/SECT 10

## (undated) DEVICE — FRCP BX RADJAW4 NDL 2.8 240CM LG OG BX40

## (undated) DEVICE — CANN NASL CO2 TRULINK W/O2 A/

## (undated) DEVICE — SUT PLAIN  3/0 CT1 27IN 842H

## (undated) DEVICE — GLV SURG BIOGEL LTX PF 6 1/2

## (undated) DEVICE — TRY SPINE BLCK WHITACRE 25G 3X5IN

## (undated) DEVICE — MAT PREVALON MOBL TRANSFR AIR WO/PAD 39X80IN